# Patient Record
Sex: MALE | Race: BLACK OR AFRICAN AMERICAN | NOT HISPANIC OR LATINO | Employment: UNEMPLOYED | URBAN - METROPOLITAN AREA
[De-identification: names, ages, dates, MRNs, and addresses within clinical notes are randomized per-mention and may not be internally consistent; named-entity substitution may affect disease eponyms.]

---

## 2017-05-20 ENCOUNTER — HOSPITAL ENCOUNTER (EMERGENCY)
Facility: HOSPITAL | Age: 47
Discharge: HOME | End: 2017-05-20
Attending: EMERGENCY MEDICINE
Payer: MEDICAID

## 2017-05-20 VITALS
DIASTOLIC BLOOD PRESSURE: 58 MMHG | HEART RATE: 55 BPM | BODY MASS INDEX: 24.44 KG/M2 | SYSTOLIC BLOOD PRESSURE: 102 MMHG | WEIGHT: 165 LBS | RESPIRATION RATE: 20 BRPM | OXYGEN SATURATION: 97 % | TEMPERATURE: 99 F | HEIGHT: 69 IN

## 2017-05-20 DIAGNOSIS — L03.116 CELLULITIS OF LEFT LOWER EXTREMITY: Primary | ICD-10-CM

## 2017-05-20 DIAGNOSIS — M79.676 TOE PAIN: ICD-10-CM

## 2017-05-20 DIAGNOSIS — L97.521 TOE ULCER, LEFT, LIMITED TO BREAKDOWN OF SKIN: ICD-10-CM

## 2017-05-20 LAB
ALBUMIN SERPL BCP-MCNC: 3.6 G/DL
ALP SERPL-CCNC: 68 U/L
ALT SERPL W/O P-5'-P-CCNC: 21 U/L
ANION GAP SERPL CALC-SCNC: 7 MMOL/L
AST SERPL-CCNC: 21 U/L
BASOPHILS # BLD AUTO: 0.01 K/UL
BASOPHILS NFR BLD: 0.2 %
BILIRUB SERPL-MCNC: 0.4 MG/DL
BUN SERPL-MCNC: 14 MG/DL
CALCIUM SERPL-MCNC: 9.5 MG/DL
CHLORIDE SERPL-SCNC: 105 MMOL/L
CO2 SERPL-SCNC: 26 MMOL/L
CREAT SERPL-MCNC: 1.1 MG/DL
DIFFERENTIAL METHOD: ABNORMAL
EOSINOPHIL # BLD AUTO: 0.1 K/UL
EOSINOPHIL NFR BLD: 1.6 %
ERYTHROCYTE [DISTWIDTH] IN BLOOD BY AUTOMATED COUNT: 14.7 %
EST. GFR  (AFRICAN AMERICAN): >60 ML/MIN/1.73 M^2
EST. GFR  (NON AFRICAN AMERICAN): >60 ML/MIN/1.73 M^2
GLUCOSE SERPL-MCNC: 85 MG/DL
HCT VFR BLD AUTO: 38 %
HGB BLD-MCNC: 12.5 G/DL
LYMPHOCYTES # BLD AUTO: 1.5 K/UL
LYMPHOCYTES NFR BLD: 27.1 %
MCH RBC QN AUTO: 27.4 PG
MCHC RBC AUTO-ENTMCNC: 32.9 %
MCV RBC AUTO: 83 FL
MONOCYTES # BLD AUTO: 0.7 K/UL
MONOCYTES NFR BLD: 13.2 %
NEUTROPHILS # BLD AUTO: 3.2 K/UL
NEUTROPHILS NFR BLD: 57.7 %
PLATELET # BLD AUTO: 231 K/UL
PMV BLD AUTO: 10.7 FL
POTASSIUM SERPL-SCNC: 4.5 MMOL/L
PROT SERPL-MCNC: 7.7 G/DL
RBC # BLD AUTO: 4.57 M/UL
SODIUM SERPL-SCNC: 138 MMOL/L
WBC # BLD AUTO: 5.6 K/UL

## 2017-05-20 PROCEDURE — 85025 COMPLETE CBC W/AUTO DIFF WBC: CPT

## 2017-05-20 PROCEDURE — 25000003 PHARM REV CODE 250: Performed by: PHYSICIAN ASSISTANT

## 2017-05-20 PROCEDURE — 80053 COMPREHEN METABOLIC PANEL: CPT

## 2017-05-20 PROCEDURE — 96375 TX/PRO/DX INJ NEW DRUG ADDON: CPT

## 2017-05-20 PROCEDURE — 99285 EMERGENCY DEPT VISIT HI MDM: CPT | Mod: 25

## 2017-05-20 PROCEDURE — 63600175 PHARM REV CODE 636 W HCPCS: Performed by: PHYSICIAN ASSISTANT

## 2017-05-20 PROCEDURE — 96365 THER/PROPH/DIAG IV INF INIT: CPT

## 2017-05-20 RX ORDER — SULFAMETHOXAZOLE AND TRIMETHOPRIM 800; 160 MG/1; MG/1
2 TABLET ORAL 2 TIMES DAILY
Qty: 40 TABLET | Refills: 0 | Status: SHIPPED | OUTPATIENT
Start: 2017-05-20 | End: 2017-05-30

## 2017-05-20 RX ORDER — CLINDAMYCIN PHOSPHATE 900 MG/50ML
900 INJECTION, SOLUTION INTRAVENOUS
Status: COMPLETED | OUTPATIENT
Start: 2017-05-20 | End: 2017-05-20

## 2017-05-20 RX ORDER — KETOROLAC TROMETHAMINE 30 MG/ML
10 INJECTION, SOLUTION INTRAMUSCULAR; INTRAVENOUS
Status: COMPLETED | OUTPATIENT
Start: 2017-05-20 | End: 2017-05-20

## 2017-05-20 RX ORDER — IBUPROFEN 600 MG/1
600 TABLET ORAL EVERY 6 HOURS PRN
Qty: 30 TABLET | Refills: 0 | Status: SHIPPED | OUTPATIENT
Start: 2017-05-20 | End: 2017-07-06

## 2017-05-20 RX ADMIN — CLINDAMYCIN IN 5 PERCENT DEXTROSE 900 MG: 18 INJECTION, SOLUTION INTRAVENOUS at 01:05

## 2017-05-20 RX ADMIN — KETOROLAC TROMETHAMINE 10 MG: 30 INJECTION, SOLUTION INTRAMUSCULAR at 01:05

## 2017-05-20 RX ADMIN — SODIUM CHLORIDE 1000 ML: 0.9 INJECTION, SOLUTION INTRAVENOUS at 01:05

## 2017-05-20 NOTE — ED AVS SNAPSHOT
[facility]  [address] [place] [address]               Max Mcgregor   2017 12:50 PM   ED    Description:  Male : 1970   Department:  [facility]           Your Care was Coordinated By:     Provider Role From To    Jamin Ponce MD Attending Provider 17 1018 --    Jett Michele PA-C Physician Assistant 17 [phone] --      Reason for Visit     Foot Problem           Diagnoses this Visit        Comments    Cellulitis of left lower extremity    -  Primary     Toe pain         Toe ulcer, left, limited to breakdown of skin           ED Disposition     ED Disposition Condition Comment    Discharge  Elevate leg when at rest.  Continue to take antibiotics as prescribed.  Ibuprofen for analgesia.  Patient to primary care office for reevaluation of wound on Monday.           To Do List           Follow-up Information     Follow up with Otilio Thorpe MD In 2 days.    Specialty:  Internal Medicine    Why:  For wound re-check    Contact information:    [address] [facility] [address]  [phone]          Follow up with [facility].    Specialty:  Emergency Medicine    Why:  As needed, If symptoms worsen    Contact information:    [address] [place] [id number] [phone]       These Medications        Disp Refills Start End    sulfamethoxazole-trimethoprim 800-160mg (BACTRIM DS) 800-160 mg Tab 40 tablet 0 2017    Take 2 tablets by mouth 2 (two) times daily. - Oral    ibuprofen (ADVIL,MOTRIN) 600 MG tablet 30 tablet 0 2017     Take 1 tablet (600 mg total) by mouth every 6 (six) hours as needed for Pain. - Oral      [facility]     [facility] On Call Nurse Care Line - 24/7 Assistance  Unless otherwise directed by your provider, please contact [facility], our nurse care line that is available for 24/7 assistance.     Registered nurses in the [facility] On Call Center  provide: appointment scheduling, clinical advisement, health education, and other advisory services.  Call: [phone] (toll free)               Medications           Message regarding Medications     Verify the changes and/or additions to your medication regime listed below are the same as discussed with your clinician today.  If any of these changes or additions are incorrect, please notify your healthcare provider.        START taking these NEW medications        Refills    sulfamethoxazole-trimethoprim 800-160mg (BACTRIM DS) 800-160 mg Tab 0    Sig: Take 2 tablets by mouth 2 (two) times daily.    Class: Print    Route: Oral    ibuprofen (ADVIL,MOTRIN) 600 MG tablet 0    Sig: Take 1 tablet (600 mg total) by mouth every 6 (six) hours as needed for Pain.    Class: Print    Route: Oral      These medications were administered today        Dose Freq    sodium chloride 0.9% bolus 1,000 mL 1,000 mL ED 1 Time    Sig: Inject 1,000 mLs into the vein ED 1 Time.    Class: Normal    Route: Intravenous    Cosign for Ordering: Accepted by Jamin Ponce MD on 5/20/2017  1:44 PM    ketorolac injection 10 mg 10 mg ED 1 Time    Sig: Inject 10 mg into the vein ED 1 Time.    Class: Normal    Route: Intravenous    Cosign for Ordering: Accepted by Jamin Ponce MD on 5/20/2017  1:44 PM    clindamycin 900 MG/50 ML D5W 900 mg/50 mL IVPB 900 mg 900 mg ED 1 Time    Sig: Inject 50 mLs (900 mg total) into the vein ED 1 Time.    Class: Normal    Route: Intravenous    Cosign for Ordering: Accepted by Jamin Ponce MD on 5/20/2017  1:44 PM           Verify that the below list of medications is an accurate representation of the medications you are currently taking.  If none reported, the list may be blank. If incorrect, please contact your healthcare provider. Carry this list with you in case of emergency.           Current Medications     hydrocortisone 2.5 % cream Apply topically 4 (four) times daily. Use as needed until the rash  "is gone    ibuprofen (ADVIL,MOTRIN) 600 MG tablet Take 1 tablet (600 mg total) by mouth every 6 (six) hours as needed for Pain.    ketoconazole (NIZORAL) 2 % cream Apply topically 2 (two) times daily.    sulfamethoxazole-trimethoprim 800-160mg (BACTRIM DS) 800-160 mg Tab Take 2 tablets by mouth 2 (two) times daily.           Clinical Reference Information           Your Vitals Were     BP Pulse Temp Resp Height Weight    116/62 (BP Location: Left arm, Patient Position: Sitting) 82 98.6 °F (37 °C) (Oral) 20 5' 9" (1.753 m) 74.8 kg (165 lb)    SpO2 BMI             97% 24.37 kg/m2         Allergies as of 5/20/2017     No Known Allergies      Immunizations Administered on Date of Encounter - 5/20/2017     None      ED Micro, Lab, POCT     Start Ordered       Status Ordering Provider    05/20/17 1310 05/20/17 1313  CBC auto differential  STAT      Final result     05/20/17 1310 05/20/17 1313  Comprehensive metabolic panel  STAT      Final result       ED Imaging Orders     Start Ordered       Status Ordering Provider    05/20/17 1312 05/20/17 1313  X-Ray Foot Complete Left  1 time imaging      Final result         Discharge Instructions         Discharge Instructions for Cellulitis  You have been diagnosed with cellulitis. This is an infection in the deepest layer of the skin. In some cases, the infection also affects the muscle. Cellulitis is caused by bacteria. The bacteria can enter the body through broken skin. This can happen with a cut, scratch, animal bite, or an insect bite that has been scratched. You may have been treated in the hospital with antibiotics and fluids. You will likely be given a prescription for antibiotics to take at home. This sheet will help you take care of yourself at home.  Home care  When you are home:  · Take the prescribed antibiotic medicine you are given as directed until it is gone. Take it even if you feel better. It treats the infection and stops it from returning. Not taking all the " medicine can make future infections hard to treat.  · Keep the infected area clean.  · When possible, raise the infected area above the level of your heart. This helps keep swelling down.  · Talk with your healthcare provider if you are in pain. Ask what kind of over-the-counter medicine you can take for pain.  · Apply clean bandages as advised.  · Take your temperature once a day for a week.  · Wash your hands often to prevent spreading the infection.  In the future, wash your hands before and after you touch cuts, scratches, or bandages. This will help prevent infection.   When to call your healthcare provider  Call your healthcare provider immediately if you have any of the following:  · Difficulty or pain when moving the joints above or below the infected area  · Discharge or pus draining from the area  · Fever of 100.4°F (38°C) or higher, or as directed by your healthcare provider  · Pain that gets worse in or around the infected   · Redness that gets worse in or around the infected area, particularly if the area of redness expands to a wider area  · Shaking chills  · Swelling of the infected area  · Vomiting   Date Last Reviewed: 8/1/2016  © 6974-6458 [employer]. [address] [place] [address]. All rights reserved. This information is not intended as a substitute for professional medical care. Always follow your healthcare professional's instructions.          Discharge References/Attachments     R.I.C.E. (ENGLISH)    WOUND CARE (ENGLISH)      [facility] Sign-Up     Activating your [facility] account is as easy as 1-2-3!     1) Visit [email or web address], select Sign Up Now, enter this activation code and your date of birth, then select Next.  Activation code not generated  Current Patient Portal Status: Account disabled      2) Create a username and password to use when you visit [facility] in the future and select a security question in case you lose your password and select Next.    3)  Enter your e-mail address and click Sign Up!    Additional Information  If you have questions, please e-mail [email or web address] or call [phone] to talk to our [facility] staff. Remember, [facility] is NOT to be used for urgent needs. For medical emergencies, dial 911.         Smoking Cessation     If you would like to quit smoking:   You may be eligible for free services if you are a [place] resident and started smoking cigarettes before September 1, 1988.  Call the Smoking Cessation Trust (SCT) toll free at (950) 862-0759 or (610) 803-5989.   Call 9-011-QUIT-NOW if you do not meet the above criteria.   Contact us via email: [email or web address]   View our website for more information: [email or web address]         [facility] complies with applicable Federal civil rights laws and does not discriminate on the basis of race, color, national origin, age, disability, or sex.        Language Assistance Services     ATTENTION: Language assistance services are available, free of charge. Please call [phone].      ATENCIÓN: Si habla español, tiene a mcqueen disposición servicios gratuitos de asistencia lingüística. Llame al [phone].     CHÚ Ý: N?u b?n nói Ti?ng Vi?t, có các d?ch v? h? tr? ngôn ng? mi?n phí dành cho b?n. G?i s? [phone].

## 2017-05-20 NOTE — DISCHARGE INSTRUCTIONS
Discharge Instructions for Cellulitis  You have been diagnosed with cellulitis. This is an infection in the deepest layer of the skin. In some cases, the infection also affects the muscle. Cellulitis is caused by bacteria. The bacteria can enter the body through broken skin. This can happen with a cut, scratch, animal bite, or an insect bite that has been scratched. You may have been treated in the hospital with antibiotics and fluids. You will likely be given a prescription for antibiotics to take at home. This sheet will help you take care of yourself at home.  Home care  When you are home:  · Take the prescribed antibiotic medicine you are given as directed until it is gone. Take it even if you feel better. It treats the infection and stops it from returning. Not taking all the medicine can make future infections hard to treat.  · Keep the infected area clean.  · When possible, raise the infected area above the level of your heart. This helps keep swelling down.  · Talk with your healthcare provider if you are in pain. Ask what kind of over-the-counter medicine you can take for pain.  · Apply clean bandages as advised.  · Take your temperature once a day for a week.  · Wash your hands often to prevent spreading the infection.  In the future, wash your hands before and after you touch cuts, scratches, or bandages. This will help prevent infection.   When to call your healthcare provider  Call your healthcare provider immediately if you have any of the following:  · Difficulty or pain when moving the joints above or below the infected area  · Discharge or pus draining from the area  · Fever of 100.4°F (38°C) or higher, or as directed by your healthcare provider  · Pain that gets worse in or around the infected   · Redness that gets worse in or around the infected area, particularly if the area of redness expands to a wider area  · Shaking chills  · Swelling of the infected area  · Vomiting   Date Last Reviewed:  8/1/2016  © 1217-8508 [employer]. [address] [place] [address], [place] [address]. All rights reserved. This information is not intended as a substitute for professional medical care. Always follow your healthcare professional's instructions.

## 2017-05-20 NOTE — ED PROVIDER NOTES
Encounter Date: 5/20/2017    SCRIBE #1 NOTE: I, Laura Mackey, am scribing for, and in the presence of,  Jett Michele PA-C. I have scribed the following portions of the note - Other sections scribed: HPI/ROS.       History     Chief Complaint   Patient presents with    Foot Problem     arrived via [facility],called to doctor's office for c/o L foot swelling/redness, pt reports lac between toe yesterday and awakening up today with worsening symptoms     Review of patient's allergies indicates:  No Known Allergies  HPI Comments: CC: Foot Problem    HPI: This 47 y.o. male with no pertinent PMHx presents to the ED via EMS c/o left foot pain and swelling that he woke up with this morning.  The patient reports he peeled dead skin from between his 4th and 5th toes accidentally tearing his skin.  He treated the area with Vaseline before going to bed, but woke this morning with the pain radiating to his entire food.  He is not currently on any medications and has no known allergies.  The patient denies fever, emesis or any other associated symptoms.        The history is provided by the patient.     History reviewed. No pertinent past medical history.  No past surgical history on file.  History reviewed. No pertinent family history.  Social History   Substance Use Topics    Smoking status: Current Every Day Smoker    Smokeless tobacco: None    Alcohol use Yes      Comment: occ     Review of Systems   Constitutional: Negative for fever.   Gastrointestinal: Negative for nausea and vomiting.   Musculoskeletal: Negative for back pain.   Skin: Positive for wound.       Physical Exam   Initial Vitals   BP Pulse Resp Temp SpO2   05/20/17 1226 05/20/17 1226 05/20/17 1226 05/20/17 1226 05/20/17 1226   116/62 82 20 98.6 °F (37 °C) 97 %     Physical Exam    Nursing note and vitals reviewed.  Constitutional: Vital signs are normal. He appears well-developed and well-nourished. He is cooperative.  Non-toxic appearance. He does  not have a sickly appearance. He does not appear ill. No distress.   HENT:   Head: Normocephalic and atraumatic.   Neck: Normal range of motion. Neck supple. No tracheal deviation present.   Pulmonary/Chest: Breath sounds normal. No stridor. No respiratory distress. He has no wheezes.   Abdominal: Soft. Bowel sounds are normal. He exhibits no distension. There is no tenderness.   Musculoskeletal: Normal range of motion. He exhibits tenderness.        Left ankle: He exhibits swelling. He exhibits no deformity and normal pulse. No lateral malleolus and no medial malleolus tenderness found. Achilles tendon exhibits no pain.        Feet:    Sensation or motor control intact.   Lymphadenopathy:     He has no cervical adenopathy.   Neurological: He is alert and oriented to person, place, and time. He has normal strength.   Skin: Skin is warm and dry. No rash and no abscess noted. No erythema.   Psychiatric: He has a normal mood and affect. His behavior is normal. Judgment and thought content normal.         ED Course   Procedures  Labs Reviewed - No data to display          Medical Decision Making:   Initial Assessment:   47-year-old male chief complaint left foot swelling and pain, after removing skin from between fourth and fifth toes of left foot.  Differential Diagnosis:   Cellulitis, abscess, infected wound, folliculitis, ulcer  Clinical Tests:   Lab Tests: Ordered and Reviewed  Radiological Study: Ordered and Reviewed  ED Management:  Patient overall well-appearing, in no acute distress, afebrile, vitals within normal limits.  Patient's chief complaint is left fifth toe pain ×2 days.  On physical exam there is significant swelling to his foot and ankle, with exquisite tenderness to palpation of fifth toe.  There is small ulcer between the toes.  I do suspect the initial ulceration is cause significant infection to his foot.  Clinically, I do suspect cellulitis.  We have blood work to ensure that there is no white  count or significant infection.  Vitals are stable, without temperature elevation.  We'll give clindamycin IV piggyback.    CBC without evidence of infection or anemia.  CMP without acute electrolyte abnormality.  X-ray without evidence of bony abnormality or osteomyelitis.  He is afebrile, vitals are stable.  I do think he is safe for outpatient management of cellulitis.  I have instructed him to follow with podiatry or his primary care physician for reevaluation of wound in 3-5 days.  Patient does understand and agree with treatment plan.  Other:   I have discussed this case with another health care provider.       <> Summary of the Discussion: I have discussed this case with Dr. Ponce.  He personally examined the patient            Scribe Attestation:   Scribe #1: I performed the above scribed service and the documentation accurately describes the services I performed. I attest to the accuracy of the note.    Attending Attestation:     Physician Attestation Statement for NP/PA:   I have conducted a face to face encounter with this patient in addition to the NP/PA, due to NP/PA Request    Other NP/PA Attestation Additions:      Medical Decision Makin yo M w/ redness and swelling to L foot.  I agree with plan.       Physician Attestation for Scribe:  Physician Attestation Statement for Scribe #1: I, Jett Michele PA-C, reviewed documentation, as scribed by Laura Mackey in my presence, and it is both accurate and complete.                 ED Course     Clinical Impression:   The primary encounter diagnosis was Cellulitis of left lower extremity. Diagnoses of Toe pain and Toe ulcer, left, limited to breakdown of skin were also pertinent to this visit.    Disposition:   Disposition: Discharged  Condition: Stable  Improved       Jett Michele PA-C  17 1443       Jamin Ponce MD  17 1440

## 2017-07-06 ENCOUNTER — HOSPITAL ENCOUNTER (EMERGENCY)
Facility: HOSPITAL | Age: 47
Discharge: HOME | End: 2017-07-06
Attending: EMERGENCY MEDICINE
Payer: MEDICAID

## 2017-07-06 VITALS
DIASTOLIC BLOOD PRESSURE: 70 MMHG | HEART RATE: 66 BPM | RESPIRATION RATE: 15 BRPM | TEMPERATURE: 99 F | HEIGHT: 69 IN | OXYGEN SATURATION: 97 % | SYSTOLIC BLOOD PRESSURE: 112 MMHG | BODY MASS INDEX: 24.44 KG/M2 | WEIGHT: 165 LBS

## 2017-07-06 DIAGNOSIS — T50.904A DRUG OVERDOSE, UNDETERMINED INTENT, INITIAL ENCOUNTER: Primary | ICD-10-CM

## 2017-07-06 DIAGNOSIS — F19.10 POLYSUBSTANCE ABUSE: ICD-10-CM

## 2017-07-06 LAB
ALBUMIN SERPL BCP-MCNC: 3.5 G/DL
ALP SERPL-CCNC: 76 U/L
ALT SERPL W/O P-5'-P-CCNC: 21 U/L
AMPHET+METHAMPHET UR QL: NEGATIVE
ANION GAP SERPL CALC-SCNC: 7 MMOL/L
APAP SERPL-MCNC: <3 UG/ML
AST SERPL-CCNC: 23 U/L
BARBITURATES UR QL SCN>200 NG/ML: NEGATIVE
BASOPHILS # BLD AUTO: 0.01 K/UL
BASOPHILS NFR BLD: 0.1 %
BENZODIAZ UR QL SCN>200 NG/ML: NORMAL
BILIRUB SERPL-MCNC: 0.3 MG/DL
BILIRUB UR QL STRIP: NEGATIVE
BUN SERPL-MCNC: 13 MG/DL
BZE UR QL SCN: NORMAL
CALCIUM SERPL-MCNC: 8.8 MG/DL
CANNABINOIDS UR QL SCN: NEGATIVE
CHLORIDE SERPL-SCNC: 105 MMOL/L
CK SERPL-CCNC: 92 U/L
CLARITY UR: CLEAR
CO2 SERPL-SCNC: 25 MMOL/L
COLOR UR: YELLOW
CREAT SERPL-MCNC: 1 MG/DL
CREAT UR-MCNC: 230.4 MG/DL
DIFFERENTIAL METHOD: ABNORMAL
EOSINOPHIL # BLD AUTO: 0 K/UL
EOSINOPHIL NFR BLD: 0.4 %
ERYTHROCYTE [DISTWIDTH] IN BLOOD BY AUTOMATED COUNT: 14.4 %
EST. GFR  (AFRICAN AMERICAN): >60 ML/MIN/1.73 M^2
EST. GFR  (NON AFRICAN AMERICAN): >60 ML/MIN/1.73 M^2
ETHANOL SERPL-MCNC: 35 MG/DL
GLUCOSE SERPL-MCNC: 94 MG/DL
GLUCOSE UR QL STRIP: NEGATIVE
HCT VFR BLD AUTO: 40.1 %
HGB BLD-MCNC: 13 G/DL
HGB UR QL STRIP: NEGATIVE
KETONES UR QL STRIP: NEGATIVE
LEUKOCYTE ESTERASE UR QL STRIP: NEGATIVE
LYMPHOCYTES # BLD AUTO: 1.3 K/UL
LYMPHOCYTES NFR BLD: 18.4 %
MAGNESIUM SERPL-MCNC: 2.2 MG/DL
MCH RBC QN AUTO: 26.7 PG
MCHC RBC AUTO-ENTMCNC: 32.4 %
MCV RBC AUTO: 82 FL
METHADONE UR QL SCN>300 NG/ML: NEGATIVE
MONOCYTES # BLD AUTO: 0.5 K/UL
MONOCYTES NFR BLD: 7.5 %
NEUTROPHILS # BLD AUTO: 5 K/UL
NEUTROPHILS NFR BLD: 73.6 %
NITRITE UR QL STRIP: NEGATIVE
OPIATES UR QL SCN: NORMAL
PCP UR QL SCN>25 NG/ML: NEGATIVE
PH UR STRIP: 5 [PH] (ref 5–8)
PLATELET # BLD AUTO: 221 K/UL
PMV BLD AUTO: 10.9 FL
POTASSIUM SERPL-SCNC: 4 MMOL/L
PROT SERPL-MCNC: 8.3 G/DL
PROT UR QL STRIP: NEGATIVE
RBC # BLD AUTO: 4.87 M/UL
SODIUM SERPL-SCNC: 137 MMOL/L
SP GR UR STRIP: 1.03 (ref 1–1.03)
TOXICOLOGY INFORMATION: NORMAL
URN SPEC COLLECT METH UR: NORMAL
UROBILINOGEN UR STRIP-ACNC: NEGATIVE EU/DL
WBC # BLD AUTO: 6.83 K/UL

## 2017-07-06 PROCEDURE — 99285 EMERGENCY DEPT VISIT HI MDM: CPT | Mod: 25

## 2017-07-06 PROCEDURE — 83735 ASSAY OF MAGNESIUM: CPT

## 2017-07-06 PROCEDURE — 81003 URINALYSIS AUTO W/O SCOPE: CPT

## 2017-07-06 PROCEDURE — 80307 DRUG TEST PRSMV CHEM ANLYZR: CPT

## 2017-07-06 PROCEDURE — 82550 ASSAY OF CK (CPK): CPT

## 2017-07-06 PROCEDURE — 80329 ANALGESICS NON-OPIOID 1 OR 2: CPT

## 2017-07-06 PROCEDURE — 85025 COMPLETE CBC W/AUTO DIFF WBC: CPT

## 2017-07-06 PROCEDURE — 93005 ELECTROCARDIOGRAM TRACING: CPT

## 2017-07-06 PROCEDURE — 80053 COMPREHEN METABOLIC PANEL: CPT

## 2017-07-06 PROCEDURE — 80320 DRUG SCREEN QUANTALCOHOLS: CPT

## 2017-07-06 PROCEDURE — 63600175 PHARM REV CODE 636 W HCPCS: Performed by: EMERGENCY MEDICINE

## 2017-07-06 PROCEDURE — 96374 THER/PROPH/DIAG INJ IV PUSH: CPT

## 2017-07-06 RX ORDER — NALOXONE HCL 0.4 MG/ML
0.4 VIAL (ML) INJECTION
Status: COMPLETED | OUTPATIENT
Start: 2017-07-06 | End: 2017-07-06

## 2017-07-06 RX ADMIN — NALOXONE HYDROCHLORIDE 0.4 MG: 0.4 INJECTION, SOLUTION INTRAMUSCULAR; INTRAVENOUS; SUBCUTANEOUS at 04:07

## 2017-07-06 NOTE — ED TRIAGE NOTES
Pt here via EMS for suspected drug overdose. Pt was found unresponsive by bystanders, who then called EMS. When EMS arrived, pt was unresponsive in vehicle; responded to sternal rub; no narcan given. Pt awake and alert now responding to questions; when asked which hospital he is asked pt stated [facility]. Knows the year and day. Denies any pain or nausea. Pt reports he used heroin and consumed alcohol.

## 2017-07-06 NOTE — ED PROVIDER NOTES
Encounter Date: 7/6/2017    SCRIBE #1 NOTE: I, Stanradha Nilson, am scribing for, and in the presence of,  Jamin Ponce MD. I have scribed the following portions of the note - Other sections scribed: HPI/ROS.       History     Chief Complaint   Patient presents with    UNRESPONSIVE     arrived via EMS, called to vehicle, EMS reports bystander saw unresponsive male in vehicle, EMS reports pt was sitting in his car in the back of his home, INITIAL GCS OF 3, STERNAL RUB AND EMS REPORTS PT AWAKEN, GCS15, NO MEDS GIVEN, ADMITS TO ETOH/HEROIN      CC: Unresponsive    HPI: This 47 y.o. Male with no pertinent PMHx presents to the ED after being found unresponsive by a bystander today. Patient recalls waking up in police presence. He states he did consume alcohol, but cannot recall how much consumption. He also reports heroin use today as well. Patient denies any chest pain, back pain, abdominal pain, fever, chills, diaphoresis, and other associated symptoms. No hx of drug overdoses.       The history is provided by the patient. No  was used.     Review of patient's allergies indicates:  No Known Allergies  History reviewed. No pertinent past medical history.  No past surgical history on file.  History reviewed. No pertinent family history.  Social History   Substance Use Topics    Smoking status: Current Every Day Smoker    Smokeless tobacco: Not on file    Alcohol use Yes      Comment: occ     Review of Systems   Constitutional: Negative for chills and fever.   HENT: Negative for congestion.    Eyes: Negative for pain.   Respiratory: Negative for cough and shortness of breath.    Cardiovascular: Negative for chest pain.   Gastrointestinal: Negative for abdominal pain, diarrhea, nausea and vomiting.   Genitourinary: Negative for difficulty urinating.   Musculoskeletal: Negative for back pain, neck pain and neck stiffness.   Skin: Negative for wound.   Neurological: Negative for dizziness,  weakness, light-headedness and numbness.       Physical Exam     Initial Vitals [07/06/17 1604]   BP Pulse Resp Temp SpO2   (!) 156/89 (!) 116 20 99.9 °F (37.7 °C) 97 %      MAP       111.33         Physical Exam    Nursing note and vitals reviewed.  HENT:   Head: Atraumatic.   Eyes: Conjunctivae and EOM are normal.   Neck: Normal range of motion.   Cardiovascular: Exam reveals no gallop and no friction rub.    No murmur heard.  Tachycardic   Pulmonary/Chest: Breath sounds normal. No respiratory distress. He has no wheezes. He has no rales.   Abdominal: Soft. Bowel sounds are normal. He exhibits no distension. There is no tenderness.   Musculoskeletal: Normal range of motion. He exhibits no edema.   Neurological:   Somnolent but arousable to verbal stimuli   Skin: Skin is warm and dry.   Psychiatric: He has a normal mood and affect.         ED Course   Procedures  Labs Reviewed   CBC W/ AUTO DIFFERENTIAL - Abnormal; Notable for the following:        Result Value    Hemoglobin 13.0 (*)     MCH 26.7 (*)     Gran% 73.6 (*)     All other components within normal limits   COMPREHENSIVE METABOLIC PANEL - Abnormal; Notable for the following:     Anion Gap 7 (*)     All other components within normal limits   ALCOHOL,MEDICAL (ETHANOL) - Abnormal; Notable for the following:     Alcohol, Medical, Serum 35 (*)     All other components within normal limits   ACETAMINOPHEN LEVEL - Abnormal; Notable for the following:     Acetaminophen (Tylenol), Serum <3.0 (*)     All other components within normal limits   MAGNESIUM   DRUG SCREEN PANEL, URINE EMERGENCY   CK   URINALYSIS             Medical Decision Making:   Initial Assessment:   47-year-old male brought in by EMS after he was found unresponsive in his vehicle at home.  Patient admits to using heroin and drinking alcohol earlier today.  He was aroused with sternal rub.  On exam he is awake and alert however by the end of my exam he does appear to be somewhat drowsy.  He has  pinpoint pupils bilaterally.  No evidence of trauma.  Remainder physical exam is normal.  Labs notable for drug screen positive for opiates, cocaine and benzodiazepines.  Alcohol level mildly elevated.  Patient did require 1 dose of Narcan.   EKG reviewed and interpreted myself shows no evidence of acute ischemia. He has declined head CT.  Patient has been observed in the ED for several hours.  He is easily arousable now.  He is not falling asleep during conversation.  A friend or family will come pick him up and bring him home and he has been instructed to follow-up with primary care soon as possible.  Patient counseled on drug use.  Return instructions given.  Patient verbalized understanding and agreement with the plan.            Scribe Attestation:   Scribe #1: I performed the above scribed service and the documentation accurately describes the services I performed. I attest to the accuracy of the note.    Attending Attestation:           Physician Attestation for Scribe:  Physician Attestation Statement for Scribe #1: I, Jamin Ponce MD, reviewed documentation, as scribed by Maik Devine in my presence, and it is both accurate and complete.                 ED Course     Clinical Impression:   The primary encounter diagnosis was Drug overdose, undetermined intent, initial encounter. A diagnosis of Polysubstance abuse was also pertinent to this visit.                           Jamin Ponce MD  07/06/17 1957

## 2017-07-06 NOTE — ED NOTES
Dr. Ponce aware of pt's initial symptoms, EMS intervention, sternal rub, initial GCS 3, reports of ETOH/heroin abuse, currently awake/alert, no meds given by EMS, verbalized understanding

## 2017-07-07 NOTE — ED NOTES
Received report from Malena CARBAJAL. 1st contact with pt. Pt AAO x 3, REU, skin warm, dry and intact. Side rails up x 2, bed in low position, wheels locked. Call bell within reach. Pt is AAOX4 w/ occasional dozing off. Pt denies any pain. Will continue to monitor

## 2020-03-09 ENCOUNTER — HOSPITAL ENCOUNTER (OUTPATIENT)
Facility: HOSPITAL | Age: 50
Discharge: HOME | End: 2020-03-12
Attending: EMERGENCY MEDICINE | Admitting: ORTHOPAEDIC SURGERY
Payer: MEDICAID

## 2020-03-09 DIAGNOSIS — M79.89 SWELLING OF LEFT HAND: ICD-10-CM

## 2020-03-09 DIAGNOSIS — A41.9 SEPSIS: ICD-10-CM

## 2020-03-09 DIAGNOSIS — L02.519 CELLULITIS AND ABSCESS OF HAND, EXCEPT FINGERS AND THUMB: Primary | ICD-10-CM

## 2020-03-09 DIAGNOSIS — L03.119 CELLULITIS AND ABSCESS OF HAND, EXCEPT FINGERS AND THUMB: Primary | ICD-10-CM

## 2020-03-09 LAB
ALBUMIN SERPL BCP-MCNC: 3.3 G/DL (ref 3.5–5.2)
ALP SERPL-CCNC: 78 U/L (ref 55–135)
ALT SERPL W/O P-5'-P-CCNC: 12 U/L (ref 10–44)
AMPHET+METHAMPHET UR QL: NEGATIVE
ANION GAP SERPL CALC-SCNC: 12 MMOL/L (ref 8–16)
AST SERPL-CCNC: 22 U/L (ref 10–40)
BARBITURATES UR QL SCN>200 NG/ML: NEGATIVE
BASOPHILS # BLD AUTO: 0.01 K/UL (ref 0–0.2)
BASOPHILS NFR BLD: 0.1 % (ref 0–1.9)
BENZODIAZ UR QL SCN>200 NG/ML: NEGATIVE
BILIRUB SERPL-MCNC: 0.4 MG/DL (ref 0.1–1)
BILIRUB UR QL STRIP: NEGATIVE
BUN SERPL-MCNC: 12 MG/DL (ref 6–20)
BZE UR QL SCN: NORMAL
CALCIUM SERPL-MCNC: 9.1 MG/DL (ref 8.7–10.5)
CANNABINOIDS UR QL SCN: NEGATIVE
CHLORIDE SERPL-SCNC: 105 MMOL/L (ref 95–110)
CLARITY UR: CLEAR
CO2 SERPL-SCNC: 18 MMOL/L (ref 23–29)
COLOR UR: YELLOW
CREAT SERPL-MCNC: 0.9 MG/DL (ref 0.5–1.4)
CREAT UR-MCNC: 61.8 MG/DL (ref 23–375)
CRP SERPL-MCNC: 65.7 MG/L (ref 0–8.2)
DIFFERENTIAL METHOD: ABNORMAL
EOSINOPHIL # BLD AUTO: 0.1 K/UL (ref 0–0.5)
EOSINOPHIL NFR BLD: 0.7 % (ref 0–8)
ERYTHROCYTE [DISTWIDTH] IN BLOOD BY AUTOMATED COUNT: 14 % (ref 11.5–14.5)
ERYTHROCYTE [SEDIMENTATION RATE] IN BLOOD BY WESTERGREN METHOD: 78 MM/HR (ref 0–10)
EST. GFR  (AFRICAN AMERICAN): >60 ML/MIN/1.73 M^2
EST. GFR  (NON AFRICAN AMERICAN): >60 ML/MIN/1.73 M^2
GLUCOSE SERPL-MCNC: 101 MG/DL (ref 70–110)
GLUCOSE UR QL STRIP: NEGATIVE
HCT VFR BLD AUTO: 39.2 % (ref 40–54)
HGB BLD-MCNC: 12.7 G/DL (ref 14–18)
HGB UR QL STRIP: NEGATIVE
IMM GRANULOCYTES # BLD AUTO: 0.03 K/UL (ref 0–0.04)
IMM GRANULOCYTES NFR BLD AUTO: 0.3 % (ref 0–0.5)
KETONES UR QL STRIP: NEGATIVE
LACTATE SERPL-SCNC: 0.8 MMOL/L (ref 0.5–2.2)
LACTATE SERPL-SCNC: 0.9 MMOL/L (ref 0.5–2.2)
LEUKOCYTE ESTERASE UR QL STRIP: NEGATIVE
LYMPHOCYTES # BLD AUTO: 1.5 K/UL (ref 1–4.8)
LYMPHOCYTES NFR BLD: 16.1 % (ref 18–48)
MCH RBC QN AUTO: 26 PG (ref 27–31)
MCHC RBC AUTO-ENTMCNC: 32.4 G/DL (ref 32–36)
MCV RBC AUTO: 80 FL (ref 82–98)
METHADONE UR QL SCN>300 NG/ML: NEGATIVE
MONOCYTES # BLD AUTO: 0.8 K/UL (ref 0.3–1)
MONOCYTES NFR BLD: 8.6 % (ref 4–15)
NEUTROPHILS # BLD AUTO: 6.8 K/UL (ref 1.8–7.7)
NEUTROPHILS NFR BLD: 74.2 % (ref 38–73)
NITRITE UR QL STRIP: NEGATIVE
NRBC BLD-RTO: 0 /100 WBC
OPIATES UR QL SCN: NORMAL
PCP UR QL SCN>25 NG/ML: NEGATIVE
PH UR STRIP: 6 [PH] (ref 5–8)
PLATELET # BLD AUTO: 303 K/UL (ref 150–350)
PMV BLD AUTO: 13.1 FL (ref 9.2–12.9)
POTASSIUM SERPL-SCNC: 4.5 MMOL/L (ref 3.5–5.1)
PROCALCITONIN SERPL IA-MCNC: 0.43 NG/ML
PROT SERPL-MCNC: 8.4 G/DL (ref 6–8.4)
PROT UR QL STRIP: NEGATIVE
RBC # BLD AUTO: 4.89 M/UL (ref 4.6–6.2)
SODIUM SERPL-SCNC: 135 MMOL/L (ref 136–145)
SP GR UR STRIP: 1.01 (ref 1–1.03)
TOXICOLOGY INFORMATION: NORMAL
URN SPEC COLLECT METH UR: NORMAL
UROBILINOGEN UR STRIP-ACNC: NEGATIVE EU/DL
WBC # BLD AUTO: 9.15 K/UL (ref 3.9–12.7)

## 2020-03-09 PROCEDURE — 96375 TX/PRO/DX INJ NEW DRUG ADDON: CPT

## 2020-03-09 PROCEDURE — 81003 URINALYSIS AUTO W/O SCOPE: CPT | Mod: 59

## 2020-03-09 PROCEDURE — 87077 CULTURE AEROBIC IDENTIFY: CPT

## 2020-03-09 PROCEDURE — 96375 TX/PRO/DX INJ NEW DRUG ADDON: CPT | Mod: 59

## 2020-03-09 PROCEDURE — G0378 HOSPITAL OBSERVATION PER HR: HCPCS

## 2020-03-09 PROCEDURE — 86140 C-REACTIVE PROTEIN: CPT

## 2020-03-09 PROCEDURE — 83605 ASSAY OF LACTIC ACID: CPT | Mod: 91

## 2020-03-09 PROCEDURE — 96367 TX/PROPH/DG ADDL SEQ IV INF: CPT

## 2020-03-09 PROCEDURE — 96366 THER/PROPH/DIAG IV INF ADDON: CPT | Mod: 59

## 2020-03-09 PROCEDURE — 85652 RBC SED RATE AUTOMATED: CPT

## 2020-03-09 PROCEDURE — 25000003 PHARM REV CODE 250: Performed by: PHYSICIAN ASSISTANT

## 2020-03-09 PROCEDURE — S0028 INJECTION, FAMOTIDINE, 20 MG: HCPCS | Performed by: PHYSICIAN ASSISTANT

## 2020-03-09 PROCEDURE — 96376 TX/PRO/DX INJ SAME DRUG ADON: CPT

## 2020-03-09 PROCEDURE — 93005 ELECTROCARDIOGRAM TRACING: CPT

## 2020-03-09 PROCEDURE — 84145 PROCALCITONIN (PCT): CPT

## 2020-03-09 PROCEDURE — 85025 COMPLETE CBC W/AUTO DIFF WBC: CPT

## 2020-03-09 PROCEDURE — 63600175 PHARM REV CODE 636 W HCPCS: Performed by: PHYSICIAN ASSISTANT

## 2020-03-09 PROCEDURE — 87040 BLOOD CULTURE FOR BACTERIA: CPT | Mod: 59

## 2020-03-09 PROCEDURE — 96376 TX/PRO/DX INJ SAME DRUG ADON: CPT | Mod: 59

## 2020-03-09 PROCEDURE — 10061 I&D ABSCESS COMP/MULTIPLE: CPT

## 2020-03-09 PROCEDURE — 96365 THER/PROPH/DIAG IV INF INIT: CPT

## 2020-03-09 PROCEDURE — 93010 ELECTROCARDIOGRAM REPORT: CPT | Mod: ,,, | Performed by: INTERNAL MEDICINE

## 2020-03-09 PROCEDURE — 87186 SC STD MICRODIL/AGAR DIL: CPT

## 2020-03-09 PROCEDURE — 99285 EMERGENCY DEPT VISIT HI MDM: CPT | Mod: 25

## 2020-03-09 PROCEDURE — 93010 EKG 12-LEAD: ICD-10-PCS | Mod: ,,, | Performed by: INTERNAL MEDICINE

## 2020-03-09 PROCEDURE — 80307 DRUG TEST PRSMV CHEM ANLYZR: CPT

## 2020-03-09 PROCEDURE — 90471 IMMUNIZATION ADMIN: CPT | Performed by: PHYSICIAN ASSISTANT

## 2020-03-09 PROCEDURE — 90715 TDAP VACCINE 7 YRS/> IM: CPT | Performed by: PHYSICIAN ASSISTANT

## 2020-03-09 PROCEDURE — 87070 CULTURE OTHR SPECIMN AEROBIC: CPT

## 2020-03-09 PROCEDURE — 80053 COMPREHEN METABOLIC PANEL: CPT

## 2020-03-09 RX ORDER — HYDROMORPHONE HYDROCHLORIDE 2 MG/ML
1 INJECTION, SOLUTION INTRAMUSCULAR; INTRAVENOUS; SUBCUTANEOUS
Status: COMPLETED | OUTPATIENT
Start: 2020-03-09 | End: 2020-03-09

## 2020-03-09 RX ORDER — VANCOMYCIN HCL IN 5 % DEXTROSE 1G/250ML
1000 PLASTIC BAG, INJECTION (ML) INTRAVENOUS
Status: DISCONTINUED | OUTPATIENT
Start: 2020-03-10 | End: 2020-03-11 | Stop reason: DRUGHIGH

## 2020-03-09 RX ORDER — SODIUM CHLORIDE 0.9 % (FLUSH) 0.9 %
10 SYRINGE (ML) INJECTION
Status: DISCONTINUED | OUTPATIENT
Start: 2020-03-09 | End: 2020-03-12 | Stop reason: HOSPADM

## 2020-03-09 RX ORDER — LIDOCAINE HYDROCHLORIDE AND EPINEPHRINE 10; 10 MG/ML; UG/ML
10 INJECTION, SOLUTION INFILTRATION; PERINEURAL
Status: COMPLETED | OUTPATIENT
Start: 2020-03-09 | End: 2020-03-09

## 2020-03-09 RX ORDER — FAMOTIDINE 10 MG/ML
20 INJECTION INTRAVENOUS EVERY 12 HOURS
Status: DISCONTINUED | OUTPATIENT
Start: 2020-03-09 | End: 2020-03-12 | Stop reason: HOSPADM

## 2020-03-09 RX ORDER — ACETAMINOPHEN 325 MG/1
650 TABLET ORAL EVERY 8 HOURS PRN
Status: DISCONTINUED | OUTPATIENT
Start: 2020-03-09 | End: 2020-03-12 | Stop reason: HOSPADM

## 2020-03-09 RX ORDER — MORPHINE SULFATE 10 MG/ML
2 INJECTION INTRAMUSCULAR; INTRAVENOUS; SUBCUTANEOUS EVERY 4 HOURS PRN
Status: DISCONTINUED | OUTPATIENT
Start: 2020-03-09 | End: 2020-03-12 | Stop reason: HOSPADM

## 2020-03-09 RX ORDER — IBUPROFEN 600 MG/1
600 TABLET ORAL EVERY 6 HOURS PRN
Status: DISCONTINUED | OUTPATIENT
Start: 2020-03-09 | End: 2020-03-12 | Stop reason: HOSPADM

## 2020-03-09 RX ORDER — ONDANSETRON 2 MG/ML
4 INJECTION INTRAMUSCULAR; INTRAVENOUS EVERY 8 HOURS PRN
Status: DISCONTINUED | OUTPATIENT
Start: 2020-03-09 | End: 2020-03-12 | Stop reason: HOSPADM

## 2020-03-09 RX ORDER — SODIUM CHLORIDE 9 MG/ML
INJECTION, SOLUTION INTRAVENOUS CONTINUOUS
Status: DISCONTINUED | OUTPATIENT
Start: 2020-03-09 | End: 2020-03-12 | Stop reason: HOSPADM

## 2020-03-09 RX ORDER — HYDROMORPHONE HYDROCHLORIDE 2 MG/ML
1 INJECTION, SOLUTION INTRAMUSCULAR; INTRAVENOUS; SUBCUTANEOUS EVERY 4 HOURS PRN
Status: DISCONTINUED | OUTPATIENT
Start: 2020-03-09 | End: 2020-03-11

## 2020-03-09 RX ADMIN — HYDROMORPHONE HYDROCHLORIDE 1 MG: 2 INJECTION, SOLUTION INTRAMUSCULAR; INTRAVENOUS; SUBCUTANEOUS at 11:03

## 2020-03-09 RX ADMIN — HYDROMORPHONE HYDROCHLORIDE 1 MG: 2 INJECTION, SOLUTION INTRAMUSCULAR; INTRAVENOUS; SUBCUTANEOUS at 12:03

## 2020-03-09 RX ADMIN — SODIUM CHLORIDE: 0.9 INJECTION, SOLUTION INTRAVENOUS at 10:03

## 2020-03-09 RX ADMIN — PIPERACILLIN AND TAZOBACTAM 4.5 G: 4; .5 INJECTION, POWDER, FOR SOLUTION INTRAVENOUS at 12:03

## 2020-03-09 RX ADMIN — CLOSTRIDIUM TETANI TOXOID ANTIGEN (FORMALDEHYDE INACTIVATED), CORYNEBACTERIUM DIPHTHERIAE TOXOID ANTIGEN (FORMALDEHYDE INACTIVATED), BORDETELLA PERTUSSIS TOXOID ANTIGEN (GLUTARALDEHYDE INACTIVATED), BORDETELLA PERTUSSIS FILAMENTOUS HEMAGGLUTININ ANTIGEN (FORMALDEHYDE INACTIVATED), BORDETELLA PERTUSSIS PERTACTIN ANTIGEN, AND BORDETELLA PERTUSSIS FIMBRIAE 2/3 ANTIGEN 0.5 ML: 5; 2; 2.5; 5; 3; 5 INJECTION, SUSPENSION INTRAMUSCULAR at 12:03

## 2020-03-09 RX ADMIN — FAMOTIDINE 20 MG: 10 INJECTION INTRAVENOUS at 10:03

## 2020-03-09 RX ADMIN — PIPERACILLIN AND TAZOBACTAM 4.5 G: 4; .5 INJECTION, POWDER, FOR SOLUTION INTRAVENOUS at 10:03

## 2020-03-09 RX ADMIN — HYDROMORPHONE HYDROCHLORIDE 1 MG: 2 INJECTION, SOLUTION INTRAMUSCULAR; INTRAVENOUS; SUBCUTANEOUS at 01:03

## 2020-03-09 RX ADMIN — VANCOMYCIN HYDROCHLORIDE 1250 MG: 1.25 INJECTION, POWDER, LYOPHILIZED, FOR SOLUTION INTRAVENOUS at 12:03

## 2020-03-09 RX ADMIN — SODIUM CHLORIDE 2313 ML: 0.9 INJECTION, SOLUTION INTRAVENOUS at 12:03

## 2020-03-09 RX ADMIN — LIDOCAINE HYDROCHLORIDE,EPINEPHRINE BITARTRATE 10 ML: 10; .01 INJECTION, SOLUTION INFILTRATION; PERINEURAL at 12:03

## 2020-03-09 NOTE — H&P
49-year-old male reports 3-4 day history of swelling and pain left dorsal wrist.  Patient believes it was a spider bite because he is seen many spiders around his home but does not recall an actual bite.  I&D done by ER PA which produced copious amounts of purulent material.  Two incisions were packed with iodoform gauze and left open and continued to drain.    Patient gives a history of IV drug abuse in the past but denies recent IV drug use.  Patient had a traumatic amputation of his right 5th digit due to a work-related injury many years ago.  This is completely healed.  Patient denies any other abscess, swelling or painful areas.    WBC normal  ESR and CRP markedly elevated    Drug screen presumptive positive for cocaine and opiates    Radiographs left wrist normal    Cultures from I and D in ER pending, blood cultures pending    Physical exam shows he is alert and oriented, afebrile.    Patient has painful range of motion in the left wrist and digits.  There is 2 incisions on the dorsal aspect of the wrist with iodoform gauze, purulent drainage present.  Tenderness to palpation.  10% range of motion wrist with pain.  Palmar and volar there is no erythema or swelling with no obvious infection on the volar aspect of the wrist.    Patient has an obvious infection which has been drained in the ER and sent for culture.  I believe he requires further drainage of the extensor tendons to the digits and possibly the wrist joint if this is involved.  It was recommended to the patient he be brought to the operating room tonight to undergo open I and D, he has refused this and wants to wait until tomorrow.  In the meantime we will obtain an MRI to determine if the left wrist joint is involved with the infection.  NPO past midnight for I&D tomorrow  MRI of the left wrist to rule out wrist joint abscess

## 2020-03-09 NOTE — ED PROVIDER NOTES
Encounter Date: 3/9/2020    SCRIBE #1 NOTE: I, Josy Radford, am scribing for, and in the presence of,  FEDERICA Richards. I have scribed the following portions of the note - Other sections scribed: HPI, ROS, PE.       History     Chief Complaint   Patient presents with    Hand Pain     left hand pain with swelling.  states spider bite.     CC: Hand Pain and Swelling  HPI: This is a 49 y.o. male with no PMHx who presents to the ED complaining of left hand pain and swelling that started 2 days ago. He reports that he woke up with worsening pain this morning. He states that he thinks a spider bit him. He denies any recent injury or trauma. He denies any exposure to salt water. He denies any allergies to medications. He denies any recent IV drug use (last used 5 or 6 years ago). No other associated symptoms.    The history is provided by the patient. No  was used.     Review of patient's allergies indicates:  No Known Allergies  History reviewed. No pertinent past medical history.  History reviewed. No pertinent surgical history.  History reviewed. No pertinent family history.  Social History     Tobacco Use    Smoking status: Current Every Day Smoker     Packs/day: 0.50     Types: Cigarettes   Substance Use Topics    Alcohol use: Yes     Comment: 6 pack of beer per week    Drug use: Never     Review of Systems   Constitutional: Negative for fever.   HENT: Negative for sore throat.    Respiratory: Negative for shortness of breath.    Cardiovascular: Negative for chest pain.   Gastrointestinal: Negative for nausea.   Genitourinary: Negative for dysuria.   Musculoskeletal: Negative for back pain.        (+) Hand pain (left)  (+) Hand swelling (left)   Skin: Negative for rash.        (+) Abscess (left hand)   Neurological: Negative for weakness.   Hematological: Does not bruise/bleed easily.       Physical Exam     Initial Vitals [03/09/20 1014]   BP Pulse Resp Temp SpO2   133/73 100 18 (!) 101.6 °F  (38.7 °C) 97 %      MAP       --         Physical Exam    Nursing note and vitals reviewed.  Constitutional: He appears well-developed and well-nourished. He appears ill. He appears distressed.   Tearful, moaning in pain.   HENT:   Head: Normocephalic and atraumatic.   Right Ear: Tympanic membrane normal.   Left Ear: Tympanic membrane normal.   Nose: Nose normal.   Mouth/Throat: Uvula is midline, oropharynx is clear and moist and mucous membranes are normal.   Eyes: EOM are normal. Pupils are equal, round, and reactive to light.   Neck: Trachea normal, normal range of motion, full passive range of motion without pain and phonation normal. Neck supple. No stridor present. No spinous process tenderness and no muscular tenderness present. Normal range of motion present. No neck rigidity.   Cardiovascular: Regular rhythm and normal heart sounds. Tachycardia present.  Exam reveals no gallop and no friction rub.    No murmur heard.  Pulmonary/Chest: Effort normal and breath sounds normal. No respiratory distress. He has no wheezes. He has no rhonchi. He has no rales.   Musculoskeletal: Normal range of motion.   Diffuse swelling to the dorsum of the left hand.  No pain with extension of fingers.   Neurological: He is alert and oriented to person, place, and time. He has normal strength. No sensory deficit.   Skin: Skin is warm and dry. Capillary refill takes less than 2 seconds. Abscess noted. There is erythema.   Diffuse edema and tenderness to the dorsal left wrist and hand 2 areas of fluctuance to the left dorsal wrist.  No crepitus or bullous lesions.  Superior healing scratches to the dorsum of the left hand.         ED Course   I & D - Incision and Drainage  Date/Time: 3/9/2020 11:43 AM  Performed by: Brian Reyes PA-C  Authorized by: Brian Reyes PA-C   Consent Done: Yes  Consent: Verbal consent obtained.  Consent given by: patient  Type: abscess  Body area: upper extremity  Location details: left  hand  Anesthesia: local infiltration    Anesthesia:  Local Anesthetic: lidocaine 1% with epinephrine  Anesthetic total: 4 mL  Scalpel size: 11  Incision type: single straight  Complexity: complex  Drainage: pus and  bloody  Drainage amount: copious  Wound treatment: incision,  wound left open,  drainage,  deloculation,  expression of material and  wound packed  Packing material: 1/4 in gauze  Complications: No  Specimens: Yes  Implants: No  Patient tolerance: Patient tolerated the procedure well with no immediate complications        Labs Reviewed   CBC W/ AUTO DIFFERENTIAL - Abnormal; Notable for the following components:       Result Value    Hemoglobin 12.7 (*)     Hematocrit 39.2 (*)     Mean Corpuscular Volume 80 (*)     Mean Corpuscular Hemoglobin 26.0 (*)     MPV 13.1 (*)     Gran% 74.2 (*)     Lymph% 16.1 (*)     All other components within normal limits   COMPREHENSIVE METABOLIC PANEL - Abnormal; Notable for the following components:    Sodium 135 (*)     CO2 18 (*)     Albumin 3.3 (*)     All other components within normal limits    Narrative:     Recoll. [id number] by Jovie at 03/09/2020 12:10, reason: Specimen   hemolyzed call to haroldo  03/09/2020  12:09   SEDIMENTATION RATE - Abnormal; Notable for the following components:    Sed Rate 78 (*)     All other components within normal limits   C-REACTIVE PROTEIN - Abnormal; Notable for the following components:    CRP 65.7 (*)     All other components within normal limits    Narrative:     Recoll. [id number] by TN3 at 03/09/2020 12:10, reason: Specimen   hemolyzed call to haroldo  03/09/2020  12:09   PROCALCITONIN - Abnormal; Notable for the following components:    Procalcitonin 0.43 (*)     All other components within normal limits    Narrative:     Recoll. [id number] by Jovie at 03/09/2020 12:10, reason: Specimen   hemolyzed call to haroldo  03/09/2020  12:09   CULTURE, BLOOD   CULTURE, BLOOD   CULTURE, AEROBIC  (SPECIFY SOURCE)   LACTIC ACID, PLASMA   LACTIC  ACID, PLASMA   URINALYSIS, REFLEX TO URINE CULTURE   DRUG SCREEN PANEL, URINE EMERGENCY          Imaging Results          X-Ray Hand 3 View Left (Final result)  Result time 03/09/20 11:44:57   Procedure changed from X-Ray Hand 2 View Left     Final result by Oh London MD (03/09/20 11:44:57)                 Impression:      As above      Electronically signed by: Oh London MD  Date:    03/09/2020  Time:    11:44             Narrative:    EXAMINATION:  XR HAND COMPLETE 3 VIEW LEFT    CLINICAL HISTORY:  swelling of left hand;. Other specified soft tissue disorders    TECHNIQUE:  PA, lateral, and oblique views of the left hand were performed.    COMPARISON:  None    FINDINGS:  There are no acute fractures or dislocations or osteoblastic or lytic lesions.  There is marked soft tissue swelling over the dorsum of the hand.  Cellulitis/infectious, or hematomas may be considered in the differential diagnosis.                              X-Rays:   Independently Interpreted Readings:   Other Readings:  X-ray left hand with soft tissue swelling without evidence of bony erosion, foreign body, or fracture    Medical Decision Making:   Clinical Tests:   Lab Tests: Ordered and Reviewed  Radiological Study: Ordered and Reviewed  Medical Tests: Ordered and Reviewed  ED Management:  49-year-old male with history and exam concerning for cellulitis with an abscess to the left hand and wrist.  Possibly from wounds while working in "[employer]ing, although, patient does admit to remote history of IV drug use.  He presents tachycardic and febrile in severe pain.  Treated for sepsis with IV fluids, and broad-spectrum antibiotics.  Blood and wound cultures obtained prior to antibiotics.  No leukocytosis or lactic acidosis.  Abscess to dorsal left hand incised and drained with copious amount of pus.  At this time there is no convincing evidence for necrotizing fasciitis or osteo.  X-ray without foreign body or fracture.  Patient's pain  managed with Dilaudid in the ED.  I discussed the case to with orthopedic surgeon Dr. Maciel, and will place in observation under orthopedic services.  Case also discussed with ED attending Dr. Wheeler, who also evaluated the patient.            Scribe Attestation:   Scribe #1: I performed the above scribed service and the documentation accurately describes the services I performed. I attest to the accuracy of the note.                          Clinical Impression:       ICD-10-CM ICD-9-CM   1. Cellulitis and abscess of hand, except fingers and thumb L03.119 682.4    L02.519    2. Swelling of left hand M79.89 729.81   3. Sepsis A41.9 038.9     995.91             ED Disposition Condition    Observation            Scribe attestation: I, Brian Reyes, personally performed the services described in this documentation. All medical record entries made by the scribe were at my direction and in my presence. I have reviewed the chart and agree that the record reflects my personal performance and is accurate and complete.               Brian Reyes, PAKendalC  03/09/20 6504

## 2020-03-09 NOTE — PROGRESS NOTES
Pt arrived to unit via w/c from ED. Pt AAOX3. No c/o pain. Pt requesting something to eat and drink. Juice and sandwich provided. Pt instructed on NPO status after midnight. SR up x2. Bed in low position. Call light in reach. Left hand dressing intact, small amount of dry blood noted to ace wrap. No distress noted. Will continue to monitor.

## 2020-03-09 NOTE — ED TRIAGE NOTES
Patient reports spider bite to left hand that happened on Saturday. Now reports pain and swelling to the area. Reports taking Tylenol for the pain, last taken at midnight.

## 2020-03-10 ENCOUNTER — ANESTHESIA EVENT (OUTPATIENT)
Dept: SURGERY | Facility: HOSPITAL | Age: 50
End: 2020-03-10
Payer: MEDICAID

## 2020-03-10 ENCOUNTER — ANESTHESIA (OUTPATIENT)
Dept: SURGERY | Facility: HOSPITAL | Age: 50
End: 2020-03-10
Payer: MEDICAID

## 2020-03-10 PROCEDURE — 87186 SC STD MICRODIL/AGAR DIL: CPT

## 2020-03-10 PROCEDURE — 87206 SMEAR FLUORESCENT/ACID STAI: CPT

## 2020-03-10 PROCEDURE — 00400 ANES INTEGUMENTARY SYS NOS: CPT | Performed by: ORTHOPAEDIC SURGERY

## 2020-03-10 PROCEDURE — 63600175 PHARM REV CODE 636 W HCPCS: Performed by: NURSE ANESTHETIST, CERTIFIED REGISTERED

## 2020-03-10 PROCEDURE — 87075 CULTR BACTERIA EXCEPT BLOOD: CPT

## 2020-03-10 PROCEDURE — 63600175 PHARM REV CODE 636 W HCPCS: Performed by: PHYSICIAN ASSISTANT

## 2020-03-10 PROCEDURE — G0378 HOSPITAL OBSERVATION PER HR: HCPCS

## 2020-03-10 PROCEDURE — 87205 SMEAR GRAM STAIN: CPT

## 2020-03-10 PROCEDURE — 87116 MYCOBACTERIA CULTURE: CPT

## 2020-03-10 PROCEDURE — 36000705 HC OR TIME LEV I EA ADD 15 MIN: Performed by: ORTHOPAEDIC SURGERY

## 2020-03-10 PROCEDURE — 87102 FUNGUS ISOLATION CULTURE: CPT

## 2020-03-10 PROCEDURE — 99204 OFFICE O/P NEW MOD 45 MIN: CPT | Mod: ,,, | Performed by: PHYSICIAN ASSISTANT

## 2020-03-10 PROCEDURE — 25000003 PHARM REV CODE 250: Performed by: NURSE ANESTHETIST, CERTIFIED REGISTERED

## 2020-03-10 PROCEDURE — D9220A PRA ANESTHESIA: Mod: ANES,,, | Performed by: ANESTHESIOLOGY

## 2020-03-10 PROCEDURE — 87070 CULTURE OTHR SPECIMN AEROBIC: CPT

## 2020-03-10 PROCEDURE — 36000704 HC OR TIME LEV I 1ST 15 MIN: Performed by: ORTHOPAEDIC SURGERY

## 2020-03-10 PROCEDURE — 63600175 PHARM REV CODE 636 W HCPCS: Performed by: ANESTHESIOLOGY

## 2020-03-10 PROCEDURE — 37000009 HC ANESTHESIA EA ADD 15 MINS: Performed by: ORTHOPAEDIC SURGERY

## 2020-03-10 PROCEDURE — 71000033 HC RECOVERY, INTIAL HOUR: Performed by: ORTHOPAEDIC SURGERY

## 2020-03-10 PROCEDURE — D9220A PRA ANESTHESIA: Mod: CRNA,,, | Performed by: NURSE ANESTHETIST, CERTIFIED REGISTERED

## 2020-03-10 PROCEDURE — 37000008 HC ANESTHESIA 1ST 15 MINUTES: Performed by: ORTHOPAEDIC SURGERY

## 2020-03-10 PROCEDURE — 99204 PR OFFICE/OUTPT VISIT, NEW, LEVL IV, 45-59 MIN: ICD-10-PCS | Mod: ,,, | Performed by: PHYSICIAN ASSISTANT

## 2020-03-10 PROCEDURE — D9220A PRA ANESTHESIA: ICD-10-PCS | Mod: CRNA,,, | Performed by: NURSE ANESTHETIST, CERTIFIED REGISTERED

## 2020-03-10 PROCEDURE — 63600175 PHARM REV CODE 636 W HCPCS: Performed by: ORTHOPAEDIC SURGERY

## 2020-03-10 PROCEDURE — 96376 TX/PRO/DX INJ SAME DRUG ADON: CPT

## 2020-03-10 PROCEDURE — D9220A PRA ANESTHESIA: ICD-10-PCS | Mod: ANES,,, | Performed by: ANESTHESIOLOGY

## 2020-03-10 RX ORDER — MIDAZOLAM HYDROCHLORIDE 1 MG/ML
INJECTION, SOLUTION INTRAMUSCULAR; INTRAVENOUS
Status: DISCONTINUED | OUTPATIENT
Start: 2020-03-10 | End: 2020-03-10

## 2020-03-10 RX ORDER — HYDROMORPHONE HYDROCHLORIDE 2 MG/ML
0.2 INJECTION, SOLUTION INTRAMUSCULAR; INTRAVENOUS; SUBCUTANEOUS EVERY 5 MIN PRN
Status: DISCONTINUED | OUTPATIENT
Start: 2020-03-10 | End: 2020-03-10 | Stop reason: HOSPADM

## 2020-03-10 RX ORDER — LIDOCAINE HYDROCHLORIDE 20 MG/ML
INJECTION INTRAVENOUS
Status: DISCONTINUED | OUTPATIENT
Start: 2020-03-10 | End: 2020-03-10

## 2020-03-10 RX ORDER — PROPOFOL 10 MG/ML
VIAL (ML) INTRAVENOUS
Status: DISCONTINUED | OUTPATIENT
Start: 2020-03-10 | End: 2020-03-10

## 2020-03-10 RX ORDER — FENTANYL CITRATE 50 UG/ML
25 INJECTION, SOLUTION INTRAMUSCULAR; INTRAVENOUS EVERY 5 MIN PRN
Status: DISCONTINUED | OUTPATIENT
Start: 2020-03-10 | End: 2020-03-10 | Stop reason: HOSPADM

## 2020-03-10 RX ORDER — FENTANYL CITRATE 50 UG/ML
INJECTION, SOLUTION INTRAMUSCULAR; INTRAVENOUS
Status: DISCONTINUED | OUTPATIENT
Start: 2020-03-10 | End: 2020-03-10

## 2020-03-10 RX ORDER — ACETAMINOPHEN 10 MG/ML
1000 INJECTION, SOLUTION INTRAVENOUS ONCE
Status: COMPLETED | OUTPATIENT
Start: 2020-03-10 | End: 2020-03-10

## 2020-03-10 RX ORDER — KETAMINE HYDROCHLORIDE 100 MG/ML
INJECTION, SOLUTION INTRAMUSCULAR; INTRAVENOUS
Status: DISCONTINUED | OUTPATIENT
Start: 2020-03-10 | End: 2020-03-10

## 2020-03-10 RX ORDER — SODIUM CHLORIDE 0.9 % (FLUSH) 0.9 %
3 SYRINGE (ML) INJECTION
Status: DISCONTINUED | OUTPATIENT
Start: 2020-03-10 | End: 2020-03-10 | Stop reason: HOSPADM

## 2020-03-10 RX ADMIN — VANCOMYCIN HYDROCHLORIDE 1000 MG: 1 INJECTION, POWDER, LYOPHILIZED, FOR SOLUTION INTRAVENOUS at 01:03

## 2020-03-10 RX ADMIN — FENTANYL CITRATE 50 MCG: 50 INJECTION INTRAMUSCULAR; INTRAVENOUS at 02:03

## 2020-03-10 RX ADMIN — PROPOFOL 50 MG: 10 INJECTION, EMULSION INTRAVENOUS at 02:03

## 2020-03-10 RX ADMIN — KETAMINE HYDROCHLORIDE 10 MG: 100 INJECTION, SOLUTION, CONCENTRATE INTRAMUSCULAR; INTRAVENOUS at 02:03

## 2020-03-10 RX ADMIN — HYDROMORPHONE HYDROCHLORIDE 0.2 MG: 2 INJECTION, SOLUTION INTRAMUSCULAR; INTRAVENOUS; SUBCUTANEOUS at 03:03

## 2020-03-10 RX ADMIN — FENTANYL CITRATE 25 MCG: 50 INJECTION, SOLUTION INTRAMUSCULAR; INTRAVENOUS at 03:03

## 2020-03-10 RX ADMIN — HYDROMORPHONE HYDROCHLORIDE 1 MG: 2 INJECTION, SOLUTION INTRAMUSCULAR; INTRAVENOUS; SUBCUTANEOUS at 06:03

## 2020-03-10 RX ADMIN — PROPOFOL 100 MG: 10 INJECTION, EMULSION INTRAVENOUS at 02:03

## 2020-03-10 RX ADMIN — PIPERACILLIN AND TAZOBACTAM 4.5 G: 4; .5 INJECTION, POWDER, FOR SOLUTION INTRAVENOUS at 04:03

## 2020-03-10 RX ADMIN — MIDAZOLAM HYDROCHLORIDE 2 MG: 1 INJECTION, SOLUTION INTRAMUSCULAR; INTRAVENOUS at 02:03

## 2020-03-10 RX ADMIN — ACETAMINOPHEN 1000 MG: 10 INJECTION, SOLUTION INTRAVENOUS at 03:03

## 2020-03-10 RX ADMIN — KETAMINE HYDROCHLORIDE 20 MG: 100 INJECTION, SOLUTION, CONCENTRATE INTRAMUSCULAR; INTRAVENOUS at 02:03

## 2020-03-10 RX ADMIN — PIPERACILLIN AND TAZOBACTAM 4.5 G: 4; .5 INJECTION, POWDER, FOR SOLUTION INTRAVENOUS at 02:03

## 2020-03-10 RX ADMIN — Medication 100 MG: at 02:03

## 2020-03-10 RX ADMIN — HYDROMORPHONE HYDROCHLORIDE 1 MG: 2 INJECTION, SOLUTION INTRAMUSCULAR; INTRAVENOUS; SUBCUTANEOUS at 10:03

## 2020-03-10 RX ADMIN — VANCOMYCIN HYDROCHLORIDE 1000 MG: 1 INJECTION, POWDER, LYOPHILIZED, FOR SOLUTION INTRAVENOUS at 02:03

## 2020-03-10 RX ADMIN — PIPERACILLIN AND TAZOBACTAM 4.5 G: 4; .5 INJECTION, POWDER, FOR SOLUTION INTRAVENOUS at 08:03

## 2020-03-10 NOTE — PROGRESS NOTES
Pharmacokinetic Initial Assessment: IV Vancomycin    Assessment/Plan:    Initiate intravenous vancomycin with loading dose of 1250 mg once followed by a maintenance dose of vancomycin 1000 mg IV every 12 hours  Desired empiric serum trough concentration is 10 to 20 mcg/mL  Draw vancomycin trough level 30 min prior to fourth dose on 03/11/2020 at approximately 00:30   Pharmacy will continue to follow and monitor vancomycin.      Please contact pharmacy at extension [phone] with any questions regarding this assessment.     Thank you for the consult,   Edith Brooks       Patient brief summary:  Max Mcgregor is a 49 y.o. male initiated on antimicrobial therapy with IV Vancomycin for treatment of suspected skin & soft tissue infection    Drug Allergies:   Review of patient's allergies indicates:  No Known Allergies    Actual Body Weight:   77.1 kg    Renal Function:   Estimated Creatinine Clearance: 91.8 mL/min (based on SCr of 0.9 mg/dL).,     Dialysis Method (if applicable):  N/A    CBC (last 72 hours):  Recent Labs   Lab Result Units 03/09/20  1143   WBC K/uL 9.15   Hemoglobin g/dL 12.7*   Hematocrit % 39.2*   Platelets K/uL 303   Gran% % 74.2*   Lymph% % 16.1*   Mono% % 8.6   Eosinophil% % 0.7   Basophil% % 0.1   Differential Method  Automated       Metabolic Panel (last 72 hours):  Recent Labs   Lab Result Units 03/09/20  1156 03/09/20  1415   Sodium mmol/L 135*  --    Potassium mmol/L 4.5  --    Chloride mmol/L 105  --    CO2 mmol/L 18*  --    Glucose mg/dL 101  --    Glucose, UA   --  Negative   BUN, Bld mg/dL 12  --    Creatinine mg/dL 0.9  --    Creatinine, Random Ur mg/dL  --  61.8   Albumin g/dL 3.3*  --    Total Bilirubin mg/dL 0.4  --    Alkaline Phosphatase U/L 78  --    AST U/L 22  --    ALT U/L 12  --        Drug levels (last 3 results):  No results for input(s): VANCOMYCINRA, VANCOMYCINPE, VANCOMYCINTR in the last 72 hours.    Microbiologic Results:  Microbiology Results (last 7 days)       Procedure Component  Value Units Date/Time    Blood culture x two cultures. Draw prior to antibiotics. [[id number]] Collected:  03/09/20 1143    Order Status:  Completed Specimen:  Blood from Peripheral, Forearm, Right Updated:  03/09/20 1912     Blood Culture, Routine No Growth to date    Narrative:       Aerobic and anaerobic    Aerobic culture #1 [[id number]] Collected:  03/09/20 1258    Order Status:  Sent Specimen:  Abscess from Hand, Left Updated:  03/09/20 1305    Blood culture x two cultures. Draw prior to antibiotics. [[id number]] Collected:  03/09/20 1156    Order Status:  Sent Specimen:  Blood from Peripheral, Hand, Right Updated:  03/09/20 1215

## 2020-03-10 NOTE — NURSING
Surgical bath done and linen changed. IV antibiotics infusing. Cont IVF. 10/10 pain; prn pain med given. Pt remained free from fall/injury. Pt given sandwich, jaison crackers and apple juice. Pt aware of being NPO after MN. Safety measures maintained. Call light within reach

## 2020-03-10 NOTE — PLAN OF CARE
03/10/20 1522   Discharge Assessment   Assessment Type Discharge Planning Assessment   Confirmed/corrected address and phone number on facesheet? Yes   Assessment information obtained from? Patient   Prior to hospitilization cognitive status: Alert/Oriented   Prior to hospitalization functional status: Independent   Current cognitive status: Alert/Oriented   Current Functional Status: Independent   Facility Arrived From: Home    Lives With other relative(s)   Able to Return to Prior Arrangements yes   Is patient able to care for self after discharge? Yes   Who are your caregiver(s) and their phone number(s)? bina Relative [phone]      Patient's perception of discharge disposition home or selfcare   Readmission Within the Last 30 Days no previous admission in last 30 days   Patient currently being followed by outpatient case management? No   Patient currently receives any other outside agency services? No   Equipment Currently Used at Home none   Do you have any problems affording any of your prescribed medications? No   Is the patient taking medications as prescribed? yes   Does the patient have transportation home? Yes   Transportation Anticipated family or friend will provide   Dialysis Name and Scheduled days N/A    Does the patient receive services at the [facility]? No   Discharge Plan A Home with family;Home Health   Discharge Plan B Home   DME Needed Upon Discharge  none   Patient/Family in Agreement with Plan yes

## 2020-03-10 NOTE — PROGRESS NOTES
MRI shows no definitive abscess in joint, obvious abscess dorsal soft tissue, ext tendons. Chronic changes seen in wrist.  Plan is I&D today (Pt refused last night), wash out joint if sinus track found into joint.

## 2020-03-10 NOTE — TRANSFER OF CARE
"Anesthesia Transfer of Care Note    Patient: Max Mcgregor    Procedure(s) Performed: Procedure(s) (LRB):  INCISION AND DRAINAGE, WRIST (Left)    Patient location: PACU    Anesthesia Type: general    Transport from OR: Transported from OR on room air with adequate spontaneous ventilation    Post pain: adequate analgesia    Post assessment: no apparent anesthetic complications    Post vital signs: stable    Level of consciousness: sedated    Nausea/Vomiting: no nausea/vomiting    Complications: none    Transfer of care protocol was followed      Last vitals:   Visit Vitals  BP (!) 128/101 (BP Location: Right arm, Patient Position: Lying)   Pulse 91   Temp 36.7 °C (98.1 °F) (Oral)   Resp 18   Ht 5' 7" (1.702 m)   Wt 77.1 kg (170 lb)   SpO2 99%   BMI 26.63 kg/m²     "

## 2020-03-10 NOTE — HPI
PT is a 49-year-old male who presented w/ 3-4 day history of swelling and pain left dorsal wrist.  Patient believes it was a spider bite because he is seen many spiders around his home but does not recall an actual bite.  I&D done by MAICOL STALLWORTH which produced copious amounts of purulent material.  Cx pending.  Pt w/ hx of IVDU denies recent use but tox screen positive for opiates and cocaine.  Pt denies F or chills or additional symptoms.    MRI-Incomplete examination.  There is diffuse edema about the dorsal aspect of the hand status post incision and drainage of presumed abscesses.  There are scattered regions of high T2 signal within the capitate, lunate, and triquetrum.  This may reflect sequela of prior injury/degenerative change however developing osseous infection is not excluded.  An additional focus is noted within the dorsal aspect of the proximal 3rd metacarpal.  Correlation is advised, consider completing the full exam once patient is better able a tolerate for more definitive evaluation of the above.

## 2020-03-10 NOTE — PLAN OF CARE
Vital signd stable   Pt rates pain to left hand 5/10  Dressing to to left hand - CDI  Pt denies N/V

## 2020-03-10 NOTE — OP NOTE
[facility]  General Surgery  Operative Note    SUMMARY     Date of Procedure: 3/10/2020     Procedure: Procedure(s) (LRB):  INCISION AND DRAINAGE, WRIST (Left)       Surgeon(s) and Role:     * Asa Maciel MD - Primary    Assisting Surgeon: None    Pre-Operative Diagnosis: Swelling of left hand [M79.89]    Post-Operative Diagnosis: Post-Op Diagnosis Codes:     * Swelling of left hand [M79.89]    Procedure excisional debridement and irrigation left wrist, cultures    Anesthesia: General    Description of the Procedure, Significant Surgical Tasks, Conducted by the Assistant(s), if Applicable, Complications, Estimated Blood Loss (EBL), Condition, Disposition:    After obtaining proper informed consent from the patient, he was taken to the operating room and given general anesthesia.  Left arm was prepped and draped in usual sterile fashion.  Tourniquet was applied to the left upper arm inflated to 250 mm of mercury for 15 min during the case.    Left hand had 2 previous incisions from the emergency room.  One was opened longitudinally with care taken to protect neurovascular structures.  Purulence material and tissue was obtained and excised and sent for culture.  The tourniquet was released hemostasis obtained using electrocautery.  The infection did not appear to involve the tendons, the tendons were exposed through the retinaculum and did not have any period of material present.  The wound was irrigated with normal saline and Betadine.  Iodoform gauze was packed in the wound.  The wound was partially closed with 3 0 running nylon suture             Implants: * No implants in log *    Specimens:   Specimen (12h ago, onward)    None                          Attestation: I was present and scrubbed for the entire procedure.

## 2020-03-10 NOTE — SUBJECTIVE & OBJECTIVE
"Past Medical History:   Diagnosis Date    Cellulitis and abscess of hand 03/09/2020    Left dorsal side, I&D done 3/9/20    Cigarette smoker     History of drug use     cocaine & IV heroin       Past Surgical History:   Procedure Laterality Date    NO PAST SURGERIES  03/09/2020       Review of patient's allergies indicates:  No Known Allergies    Medications:  No medications prior to admission.     Antibiotics (From admission, onward)    Start     Stop Route Frequency Ordered    03/10/20 0100  vancomycin in dextrose 5 % 1 gram/250 mL IVPB 1,000 mg      -- IV Every 12 hours (non-standard times) 03/09/20 2004 03/09/20 2100  piperacillin-tazobactam 4.5 g in sodium chloride 0.9% 100 mL IVPB (ready to mix system)      -- IV Every 8 hours (non-standard times) 03/09/20 1938        Antifungals (From admission, onward)    None        Antivirals (From admission, onward)    None           Immunization History   Administered Date(s) Administered    Tdap 03/09/2020       Family History     None        Social History     Socioeconomic History    Marital status: Single     Spouse name: Not on file    Number of children: Not on file    Years of education: Not on file    Highest education level: Not on file   Occupational History    Not on file   Social Needs    Financial resource strain: Not on file    Food insecurity:     Worry: Not on file     Inability: Not on file    Transportation needs:     Medical: Not on file     Non-medical: Not on file   Tobacco Use    Smoking status: Current Every Day Smoker     Packs/day: 0.50     Types: Cigarettes    Smokeless tobacco: Never Used   Substance and Sexual Activity    Alcohol use: Yes     Comment: 6 pack of beer per week    Drug use: Not Currently     Types: Heroin, "Crack" cocaine     Comment: 3/9/20:  IV heroin     Sexual activity: Yes     Partners: Female   Lifestyle    Physical activity:     Days per week: Not on file     Minutes per session: Not on file    " Stress: Not on file   Relationships    Social connections:     Talks on phone: Not on file     Gets together: Not on file     Attends [employer] service: Not on file     Active member of club or organization: Not on file     Attends meetings of clubs or organizations: Not on file     Relationship status: Not on file   Other Topics Concern    Not on file   Social History Narrative    Not on file     Review of Systems   Unable to perform ROS: Other (Pt sleeping and uncooperative)   Constitutional: Negative for chills and fever.   Skin: Positive for wound.     Objective:     Vital Signs (Most Recent):  Temp: 98.1 °F (36.7 °C) (03/10/20 1509)  Pulse: 91 (03/10/20 1509)  Resp: 18 (03/10/20 1509)  BP: (!) 128/101 (03/10/20 1509)  SpO2: 99 % (03/10/20 1509) Vital Signs (24h Range):  Temp:  [98.1 °F (36.7 °C)-98.6 °F (37 °C)] 98.1 °F (36.7 °C)  Pulse:  [54-91] 91  Resp:  [16-18] 18  SpO2:  [96 %-99 %] 99 %  BP: (123-134)/() 128/101     Weight: 77.1 kg (170 lb)  Body mass index is 26.63 kg/m².    Estimated Creatinine Clearance: 91.8 mL/min (based on SCr of 0.9 mg/dL).    Physical Exam   Constitutional: He appears well-developed and well-nourished. No distress.   Cardiovascular: Normal rate, regular rhythm and normal heart sounds.   No murmur heard.  Pulmonary/Chest: No respiratory distress. He has no wheezes. He has no rales.   Abdominal: He exhibits no distension and no mass. There is no guarding.   Skin: He is not diaphoretic.   L wrist wrapped       Significant Labs: All pertinent labs within the past 24 hours have been reviewed.    Significant Imaging: I have reviewed all pertinent imaging results/findings within the past 24 hours.

## 2020-03-10 NOTE — ANESTHESIA PREPROCEDURE EVALUATION
"                                                                                                             03/10/2020  Max Mcgregor is a 49 y.o., male.    Pre-operative evaluation for Procedure(s) (LRB):  INCISION AND DRAINAGE, WRIST (Left)    Max Mcgregor is a 49 y.o. male     Denies CP/SOB/GERD/MI/CVA/URI symptoms.  METS > 4  NPO > 8    Patient Active Problem List   Diagnosis    Cellulitis and abscess of hand, except fingers and thumb       Review of patient's allergies indicates:  No Known Allergies    No current facility-administered medications on file prior to encounter.      No current outpatient medications on file prior to encounter.       Past Surgical History:   Procedure Laterality Date    NO PAST SURGERIES  03/09/2020       Social History     Socioeconomic History    Marital status: Single     Spouse name: Not on file    Number of children: Not on file    Years of education: Not on file    Highest education level: Not on file   Occupational History    Not on file   Social Needs    Financial resource strain: Not on file    Food insecurity:     Worry: Not on file     Inability: Not on file    Transportation needs:     Medical: Not on file     Non-medical: Not on file   Tobacco Use    Smoking status: Current Every Day Smoker     Packs/day: 0.50     Types: Cigarettes    Smokeless tobacco: Never Used   Substance and Sexual Activity    Alcohol use: Yes     Comment: 6 pack of beer per week    Drug use: Not Currently     Types: Heroin, "Crack" cocaine     Comment: 3/9/20:  IV heroin     Sexual activity: Yes     Partners: Female   Lifestyle    Physical activity:     Days per week: Not on file     Minutes per session: Not on file    Stress: Not on file   Relationships    Social connections:     Talks on phone: Not on file     Gets together: Not on file     Attends [employer] service: Not on file     Active member of club or organization: Not on file     Attends meetings of clubs or " organizations: Not on file     Relationship status: Not on file   Other Topics Concern    Not on file   Social History Narrative    Not on file         CBC:   Recent Labs     03/09/20  1143   WBC 9.15   RBC 4.89   HGB 12.7*   HCT 39.2*      MCV 80*   MCH 26.0*   MCHC 32.4       CMP:   Recent Labs     03/09/20  1156   *   K 4.5      CO2 18*   BUN 12   CREATININE 0.9      CALCIUM 9.1   ALBUMIN 3.3*   PROT 8.4   ALKPHOS 78   ALT 12   AST 22   BILITOT 0.4       INR  No results for input(s): PT, INR, PROTIME, APTT in the last 72 hours.      Vitals:    03/10/20 0726   BP: 123/72   Pulse: 67   Resp: 17   Temp: 36.9 °C (98.5 °F)     See nursing charting for additional vital signs      Diagnostic Studies:  Results for VALARIE DELACRUZ (MRN [id number]) as of 3/10/2020 08:53   Ref. Range 3/9/2020 11:43   WBC Latest Ref Range: 3.90 - 12.70 K/uL 9.15   RBC Latest Ref Range: 4.60 - 6.20 M/uL 4.89   Hemoglobin Latest Ref Range: 14.0 - 18.0 g/dL 12.7 (L)   Hematocrit Latest Ref Range: 40.0 - 54.0 % 39.2 (L)   MCV Latest Ref Range: 82 - 98 fL 80 (L)   MCH Latest Ref Range: 27.0 - 31.0 pg 26.0 (L)   MCHC Latest Ref Range: 32.0 - 36.0 g/dL 32.4   RDW Latest Ref Range: 11.5 - 14.5 % 14.0   Platelets Latest Ref Range: 150 - 350 K/uL 303   MPV Latest Ref Range: 9.2 - 12.9 fL 13.1 (H)   Gran% Latest Ref Range: 38.0 - 73.0 % 74.2 (H)   Gran # (ANC) Latest Ref Range: 1.8 - 7.7 K/uL 6.8   Lymph% Latest Ref Range: 18.0 - 48.0 % 16.1 (L)   Lymph # Latest Ref Range: 1.0 - 4.8 K/uL 1.5   Mono% Latest Ref Range: 4.0 - 15.0 % 8.6   Mono # Latest Ref Range: 0.3 - 1.0 K/uL 0.8   Eosinophil% Latest Ref Range: 0.0 - 8.0 % 0.7   Eos # Latest Ref Range: 0.0 - 0.5 K/uL 0.1   Basophil% Latest Ref Range: 0.0 - 1.9 % 0.1   Baso # Latest Ref Range: 0.00 - 0.20 K/uL 0.01   nRBC Latest Ref Range: 0 /100 WBC 0   Differential Method Unknown Automated   Immature Grans (Abs) Latest Ref Range: 0.00 - 0.04 K/uL 0.03   Immature Granulocytes  Latest Ref Range: 0.0 - 0.5 % 0.3     Results for VALARIE DELACRUZ (MRN [id number]) as of 3/10/2020 08:53   Ref. Range 3/9/2020 11:56   Sodium Latest Ref Range: 136 - 145 mmol/L 135 (L)   Potassium Latest Ref Range: 3.5 - 5.1 mmol/L 4.5   Chloride Latest Ref Range: 95 - 110 mmol/L 105   CO2 Latest Ref Range: 23 - 29 mmol/L 18 (L)   Anion Gap Latest Ref Range: 8 - 16 mmol/L 12   BUN, Bld Latest Ref Range: 6 - 20 mg/dL 12   Creatinine Latest Ref Range: 0.5 - 1.4 mg/dL 0.9   eGFR if non African American Latest Ref Range: >60 mL/min/1.73 m^2 >60   eGFR if  Latest Ref Range: >60 mL/min/1.73 m^2 >60   Glucose Latest Ref Range: 70 - 110 mg/dL 101   Calcium Latest Ref Range: 8.7 - 10.5 mg/dL 9.1   Alkaline Phosphatase Latest Ref Range: 55 - 135 U/L 78   PROTEIN TOTAL Latest Ref Range: 6.0 - 8.4 g/dL 8.4   Albumin Latest Ref Range: 3.5 - 5.2 g/dL 3.3 (L)   BILIRUBIN TOTAL Latest Ref Range: 0.1 - 1.0 mg/dL 0.4   AST Latest Ref Range: 10 - 40 U/L 22   ALT Latest Ref Range: 10 - 44 U/L 12   CRP Latest Ref Range: 0.0 - 8.2 mg/L 65.7 (H)     EKG (3/9/20):  Normal sinus rhythm  Normal ECG    Anesthesia Evaluation    I have reviewed the Patient Summary Reports.    I have reviewed the Nursing Notes.      Review of Systems  Anesthesia Hx:  No previous Anesthesia    Social:  Smoker, Alcohol Use Pt endorses drinking 6-pack per day but denies additional illicit drug use & denies use of pain Rx meds.   Hematology/Oncology:         -- Anemia:   Cardiovascular:  Cardiovascular Normal Exercise tolerance: good  ECG has been reviewed.    Pulmonary:  Pulmonary Normal    Hepatic/GI:  Hepatic/GI Normal    Neurological:  Neurology Normal    Endocrine:  Endocrine Normal        Physical Exam  General:  Well nourished    Airway/Jaw/Neck:  AIRWAY FINDINGS: Normal      Chest/Lungs:  Chest/Lungs Clear    Heart/Vascular:  Heart Findings: Normal       Mental Status:  Mental Status Findings: Normal        Anesthesia Plan  Type of Anesthesia,  risks & benefits discussed:  Anesthesia Type:  general  Patient's Preference:   Intra-op Monitoring Plan: standard ASA monitors  Intra-op Monitoring Plan Comments:   Post Op Pain Control Plan:   Post Op Pain Control Plan Comments:   Induction:   IV  Beta Blocker:  Patient is not currently on a Beta-Blocker (No further documentation required).       Informed Consent: Patient understands risks and agrees with Anesthesia plan.  Questions answered. Anesthesia consent signed with patient.  ASA Score: 2     Day of Surgery Review of History & Physical:    H&P update referred to the provider.         Ready For Surgery From Anesthesia Perspective.

## 2020-03-10 NOTE — CONSULTS
[facility]  Infectious Disease  Consult Note    Patient Name: Max Mcgregor  MRN: [id number]  Admission Date: 3/9/2020  Hospital Length of Stay: 0 days  Attending Physician: Asa Maciel MD  Primary Care Provider: Primary Doctor No     Isolation Status: No active isolations    Patient information was obtained from patient, past medical records and ER records.      Inpatient consult to Infectious Diseases  Consult performed by: Ramón Holley PA-C  Consult ordered by: Asa Maciel MD        Assessment/Plan:     Cellulitis and abscess of hand, except fingers and thumb  PT is a 49-year-old male who presented w/ 3-4 day history of swelling and pain left dorsal wrist.  Patient believes it was a spider bite because he is seen many spiders around his home but does not recall an actual bite.  I&D done by ER PA which produced copious amounts of purulent material.  Cx pending.  Pt w/ hx of IVDU denies recent use but tox screen positive for opiates and cocaine.  Pt denies F or chills or additional symptoms.    MRI-Incomplete examination.  There is diffuse edema about the dorsal aspect of the hand status post incision and drainage of presumed abscesses.  There are scattered regions of high T2 signal within the capitate, lunate, and triquetrum.  This may reflect sequela of prior injury/degenerative change however developing osseous infection is not excluded.  An additional focus is noted within the dorsal aspect of the proximal 3rd metacarpal.  Correlation is advised, consider completing the full exam once patient is better able a tolerate for more definitive evaluation of the above.    Pt taken to OR today for I and D by Ortho    Plan  -Continue vanc.  Pharmacy to dose.  Trough [id number]  -Discontinue Zosyn and start on Cefepime  -Will follow cxs results and tailor abx accordingly  -Follow ortho sx notes to determine if concern for bone infection  -ID will continue to  follow.        Thank you for your consult. I will follow-up with patient. Please contact us if you have any additional questions.    Ramón Holley PA-C  Infectious Disease  [facility] [place]    Subjective:     Principal Problem: <principal problem not specified>    HPI: PT is a 49-year-old male who presented w/ 3-4 day history of swelling and pain left dorsal wrist.  Patient believes it was a spider bite because he is seen many spiders around his home but does not recall an actual bite.  I&D done by ER PA which produced copious amounts of purulent material.  Cx pending.   Pt w/ hx of IVDU denies recent use but tox screen positive for opiates and cocaine.  Pt denies F or chills or additional symptoms.    MRI-Incomplete examination.  There is diffuse edema about the dorsal aspect of the hand status post incision and drainage of presumed abscesses.  There are scattered regions of high T2 signal within the capitate, lunate, and triquetrum.  This may reflect sequela of prior injury/degenerative change however developing osseous infection is not excluded.  An additional focus is noted within the dorsal aspect of the proximal 3rd metacarpal.  Correlation is advised, consider completing the full exam once patient is better able a tolerate for more definitive evaluation of the above.    Past Medical History:   Diagnosis Date    Cellulitis and abscess of hand 03/09/2020    Left dorsal side, I&D done 3/9/20    Cigarette smoker     History of drug use     cocaine & IV heroin       Past Surgical History:   Procedure Laterality Date    NO PAST SURGERIES  03/09/2020       Review of patient's allergies indicates:  No Known Allergies    Medications:  No medications prior to admission.     Antibiotics (From admission, onward)    Start     Stop Route Frequency Ordered    03/10/20 0100  vancomycin in dextrose 5 % 1 gram/250 mL IVPB 1,000 mg      -- IV Every 12 hours (non-standard times) 03/09/20 2004     "03/09/20 2100  piperacillin-tazobactam 4.5 g in sodium chloride 0.9% 100 mL IVPB (ready to mix system)      -- IV Every 8 hours (non-standard times) 03/09/20 1938        Antifungals (From admission, onward)    None        Antivirals (From admission, onward)    None           Immunization History   Administered Date(s) Administered    Tdap 03/09/2020       Family History     None        Social History     Socioeconomic History    Marital status: Single     Spouse name: Not on file    Number of children: Not on file    Years of education: Not on file    Highest education level: Not on file   Occupational History    Not on file   Social Needs    Financial resource strain: Not on file    Food insecurity:     Worry: Not on file     Inability: Not on file    Transportation needs:     Medical: Not on file     Non-medical: Not on file   Tobacco Use    Smoking status: Current Every Day Smoker     Packs/day: 0.50     Types: Cigarettes    Smokeless tobacco: Never Used   Substance and Sexual Activity    Alcohol use: Yes     Comment: 6 pack of beer per week    Drug use: Not Currently     Types: Heroin, "Crack" cocaine     Comment: 3/9/20:  IV heroin     Sexual activity: Yes     Partners: Female   Lifestyle    Physical activity:     Days per week: Not on file     Minutes per session: Not on file    Stress: Not on file   Relationships    Social connections:     Talks on phone: Not on file     Gets together: Not on file     Attends Judaism service: Not on file     Active member of club or organization: Not on file     Attends meetings of clubs or organizations: Not on file     Relationship status: Not on file   Other Topics Concern    Not on file   Social History Narrative    Not on file     Review of Systems   Unable to perform ROS: Other (Pt sleeping and uncooperative)   Constitutional: Negative for chills and fever.   Skin: Positive for wound.     Objective:     Vital Signs (Most Recent):  Temp: 98.1 °F " (36.7 °C) (03/10/20 1509)  Pulse: 91 (03/10/20 1509)  Resp: 18 (03/10/20 1509)  BP: (!) 128/101 (03/10/20 1509)  SpO2: 99 % (03/10/20 1509) Vital Signs (24h Range):  Temp:  [98.1 °F (36.7 °C)-98.6 °F (37 °C)] 98.1 °F (36.7 °C)  Pulse:  [54-91] 91  Resp:  [16-18] 18  SpO2:  [96 %-99 %] 99 %  BP: (123-134)/() 128/101     Weight: 77.1 kg (170 lb)  Body mass index is 26.63 kg/m².    Estimated Creatinine Clearance: 91.8 mL/min (based on SCr of 0.9 mg/dL).    Physical Exam   Constitutional: He appears well-developed and well-nourished. No distress.   Cardiovascular: Normal rate, regular rhythm and normal heart sounds.   No murmur heard.  Pulmonary/Chest: No respiratory distress. He has no wheezes. He has no rales.   Abdominal: He exhibits no distension and no mass. There is no guarding.   Skin: He is not diaphoretic.   L wrist wrapped       Significant Labs: All pertinent labs within the past 24 hours have been reviewed.    Significant Imaging: I have reviewed all pertinent imaging results/findings within the past 24 hours.

## 2020-03-11 LAB
GRAM STN SPEC: NORMAL
GRAM STN SPEC: NORMAL
VANCOMYCIN TROUGH SERPL-MCNC: 7.2 UG/ML (ref 10–22)

## 2020-03-11 PROCEDURE — 99214 OFFICE O/P EST MOD 30 MIN: CPT | Mod: ,,, | Performed by: PHYSICIAN ASSISTANT

## 2020-03-11 PROCEDURE — 63600175 PHARM REV CODE 636 W HCPCS: Performed by: ORTHOPAEDIC SURGERY

## 2020-03-11 PROCEDURE — S0028 INJECTION, FAMOTIDINE, 20 MG: HCPCS | Performed by: ORTHOPAEDIC SURGERY

## 2020-03-11 PROCEDURE — 25000003 PHARM REV CODE 250: Performed by: ORTHOPAEDIC SURGERY

## 2020-03-11 PROCEDURE — 80202 ASSAY OF VANCOMYCIN: CPT

## 2020-03-11 PROCEDURE — 36415 COLL VENOUS BLD VENIPUNCTURE: CPT

## 2020-03-11 PROCEDURE — 63600175 PHARM REV CODE 636 W HCPCS: Performed by: PHYSICIAN ASSISTANT

## 2020-03-11 PROCEDURE — 99214 PR OFFICE/OUTPT VISIT, EST, LEVL IV, 30-39 MIN: ICD-10-PCS | Mod: ,,, | Performed by: PHYSICIAN ASSISTANT

## 2020-03-11 PROCEDURE — G0378 HOSPITAL OBSERVATION PER HR: HCPCS

## 2020-03-11 RX ORDER — HYDROCODONE BITARTRATE AND ACETAMINOPHEN 5; 325 MG/1; MG/1
1 TABLET ORAL EVERY 6 HOURS PRN
Status: DISCONTINUED | OUTPATIENT
Start: 2020-03-11 | End: 2020-03-12 | Stop reason: HOSPADM

## 2020-03-11 RX ADMIN — VANCOMYCIN HYDROCHLORIDE 1250 MG: 1.25 INJECTION, POWDER, LYOPHILIZED, FOR SOLUTION INTRAVENOUS at 02:03

## 2020-03-11 RX ADMIN — PIPERACILLIN AND TAZOBACTAM 4.5 G: 4; .5 INJECTION, POWDER, FOR SOLUTION INTRAVENOUS at 04:03

## 2020-03-11 RX ADMIN — HYDROMORPHONE HYDROCHLORIDE 1 MG: 2 INJECTION, SOLUTION INTRAMUSCULAR; INTRAVENOUS; SUBCUTANEOUS at 10:03

## 2020-03-11 RX ADMIN — HYDROCODONE BITARTRATE AND ACETAMINOPHEN 1 TABLET: 5; 325 TABLET ORAL at 08:03

## 2020-03-11 RX ADMIN — SODIUM CHLORIDE: 0.9 INJECTION, SOLUTION INTRAVENOUS at 08:03

## 2020-03-11 RX ADMIN — CEFTRIAXONE SODIUM 2 G: 2 INJECTION, SOLUTION INTRAVENOUS at 10:03

## 2020-03-11 RX ADMIN — VANCOMYCIN HYDROCHLORIDE 1000 MG: 1 INJECTION, POWDER, LYOPHILIZED, FOR SOLUTION INTRAVENOUS at 01:03

## 2020-03-11 RX ADMIN — HYDROMORPHONE HYDROCHLORIDE 1 MG: 2 INJECTION, SOLUTION INTRAMUSCULAR; INTRAVENOUS; SUBCUTANEOUS at 02:03

## 2020-03-11 RX ADMIN — HYDROMORPHONE HYDROCHLORIDE 1 MG: 2 INJECTION, SOLUTION INTRAMUSCULAR; INTRAVENOUS; SUBCUTANEOUS at 06:03

## 2020-03-11 RX ADMIN — FAMOTIDINE 20 MG: 10 INJECTION INTRAVENOUS at 08:03

## 2020-03-11 RX ADMIN — HYDROCODONE BITARTRATE AND ACETAMINOPHEN 1 TABLET: 5; 325 TABLET ORAL at 02:03

## 2020-03-11 NOTE — ASSESSMENT & PLAN NOTE
"PT is a 49-year-old male who presented w/ 3-4 day history of swelling and pain left dorsal wrist.  Patient believes it was a spider bite because he is seen many spiders around his home but does not recall an actual bite.  I&D done by MAICOL STALLWORTH which produced copious amounts of purulent material. Viridans Strep.  Pt w/ hx of IVDU denies recent use but tox screen positive for opiates and cocaine.  Pt denies F or chills or additional symptoms.  ESR 78.  CRP 65.7    MRI-Incomplete examination.  There is diffuse edema about the dorsal aspect of the hand status post incision and drainage of presumed abscesses.      Pt taken to OR today for I and D by Ortho per notes "The infection did not appear to involve the tendons, the tendons were exposed through the retinaculum and did not have any period of material present."    Pt w/ hx of IVDU and non cooperation here not a good candidate for IV abx.    Plan  -Discontinue Vanc   -Continue Ceftriaxone for Strep Viridans  -Pt likely can be transitioned to Augmentin as an outpt.  Susceptibility and ID pending of Strep viridans  -Will follow cxs results and tailor abx accordingly  - Plan for 4 week treatment of PO abx w/ repeat labs and ID f/u to determine if additional abx needed  -ID will continue to follow w/ likely final recs tomorrow.  "

## 2020-03-11 NOTE — NURSING
Vanc Trough 7.2. Ok to give scheduled dose per pharmacy. IV antibiotics infusing. Will cont to monitor

## 2020-03-11 NOTE — PROGRESS NOTES
Progress Note  Orthopedics    Admit Date: 3/9/2020  Post-operative Day: 1 Day Post-Op  Hospital Day: 3    Follow-up For: Procedure(s) (LRB):  INCISION AND DRAINAGE, WRIST (Left)    SUBJECTIVE:     Max Mcgregor is 49 y.o. and is now 1 day post surgery. Pain is controlled with current analgesics.. He  He is ambulating.  Weight Bearing: WBAT    A&O. AFVSS. NVI L UE.  Dressing and packing removed L UE.  New sterile dressing applied.  Cultures growing Strep Viridans.  Sensitivities pending.    OBJECTIVE:     Vital Signs (Most Recent)  Temp: 98.1 °F (36.7 °C) (03/11/20 1107)  Pulse: (!) 54 (03/11/20 1107)  Resp: 17 (03/11/20 1107)  BP: 128/83 (03/11/20 1107)  SpO2: 98 % (03/11/20 1107)      Physical Exam:  Dressing: clean, dry and intact  Incision: no significant drainage, no significant erythema  DVT Evaluation: No evidence of DVT seen on physical exam.  Neurovascular: 5/5 motor strength, sensation intact, palpable pulses    Laboratory:  Recent Results (from the past 24 hour(s))   Gram stain    Collection Time: 03/10/20  2:45 PM   Result Value Ref Range    Gram Stain Result Few WBC's     Gram Stain Result No organisms seen    Aerobic culture    Collection Time: 03/10/20  2:45 PM   Result Value Ref Range    Aerobic Bacterial Culture No growth    VANCOMYCIN, TROUGH before 4th dose    Collection Time: 03/11/20 12:24 AM   Result Value Ref Range    Vancomycin-Trough 7.2 (L) 10.0 - 22.0 ug/mL         ASSESSMENT/PLAN:     Assessment: orthopedic stable  Status Post: I&D L wrist/hand    Plan: OK to d/c home from ortho standpoint once C&S are known and ID directs antibiotic regimen.  Would prefer po Abx in lieu of IV Abx if possible 2/2 IVDA history. Will order Q8H dressing changes L wrist/hand with Hibiclens and sterile 4X4s.  Con't the same once home.  RTC 1 week for wound check.

## 2020-03-11 NOTE — PLAN OF CARE
Problem: Adult Inpatient Plan of Care  Goal: Plan of Care Review  Outcome: Ongoing, Progressing  Flowsheets (Taken 3/11/2020 5925)  Plan of Care Reviewed With: patient   Pt free from falls, injury or any further trauma throughout shift. Pt AAO x 4. Continued medications as ordered. Complaints of pain, controlled with medications. Wound care TID. Awaiting sensitivity for abx recs from ID. Pt in no distress. Will cont to monitor.

## 2020-03-11 NOTE — NURSING
Pt transferred back to floor via stretcher. Pt requesting food, pain 8/10, but then falls asleep. Pt given snack until dinner arrives. Pt in no distress. Will cont to monitor.

## 2020-03-11 NOTE — PROGRESS NOTES
VANCOMYCIN DOSING BY PHARMACY DISCONTINUATION NOTE    Max Mcgregor is a 49 y.o. male who had been consulted for vancomycin dosing.    The pharmacy consult for vancomycin dosing has been discontinued.     Vancomycin Dosing by Pharmacy Consult will sign-off. Please reconsult if necessary. Thank you for allowing us to participate in this patient's care.     Diane Thorpe  [phone]

## 2020-03-11 NOTE — SUBJECTIVE & OBJECTIVE
Interval History: Pt afebrile w/o white count.  Pt w/o complaints.  Cx from I and D in the ED growing Strep Viridans.    Review of Systems   Constitutional: Negative for chills and fever.   Respiratory: Negative for cough and shortness of breath.    Cardiovascular: Negative for chest pain and leg swelling.   Gastrointestinal: Negative for abdominal distention, abdominal pain, constipation, diarrhea and nausea.   Genitourinary: Negative for difficulty urinating and dysuria.   Musculoskeletal: Negative for arthralgias, back pain, joint swelling and myalgias.   Skin: Positive for wound.     Objective:     Vital Signs (Most Recent):  Temp: 98.1 °F (36.7 °C) (03/11/20 1107)  Pulse: (!) 54 (03/11/20 1107)  Resp: 17 (03/11/20 1107)  BP: 128/83 (03/11/20 1107)  SpO2: 98 % (03/11/20 1107) Vital Signs (24h Range):  Temp:  [97.9 °F (36.6 °C)-98.5 °F (36.9 °C)] 98.1 °F (36.7 °C)  Pulse:  [54-94] 54  Resp:  [16-21] 17  SpO2:  [98 %-100 %] 98 %  BP: (119-133)/() 128/83     Weight: 77.1 kg (170 lb)  Body mass index is 26.63 kg/m².    Estimated Creatinine Clearance: 91.8 mL/min (based on SCr of 0.9 mg/dL).    Physical Exam   Constitutional: He appears well-developed and well-nourished. No distress.   Cardiovascular: Normal rate, regular rhythm and normal heart sounds.   No murmur heard.  Pulmonary/Chest: No respiratory distress. He has no wheezes. He has no rales.   Abdominal: He exhibits no distension and no mass. There is no guarding.   Skin: He is not diaphoretic.   L wrist wrapped       Significant Labs: All pertinent labs within the past 24 hours have been reviewed.    Significant Imaging: I have reviewed all pertinent imaging results/findings within the past 24 hours.

## 2020-03-11 NOTE — PROGRESS NOTES
"[facility]  Infectious Disease  Progress Note    Patient Name: Max Mcgregor  MRN: [id number]  Admission Date: 3/9/2020  Length of Stay: 0 days  Attending Physician: Asa Maciel MD  Primary Care Provider: Primary Doctor No    Isolation Status: No active isolations  Assessment/Plan:      * Cellulitis and abscess of hand, except fingers and thumb  PT is a 49-year-old male who presented w/ 3-4 day history of swelling and pain left dorsal wrist.  Patient believes it was a spider bite because he is seen many spiders around his home but does not recall an actual bite.  I&D done by ER PA which produced copious amounts of purulent material. Viridans Strep.  Pt w/ hx of IVDU denies recent use but tox screen positive for opiates and cocaine.  Pt denies F or chills or additional symptoms.  ESR 78.  CRP 65.7    MRI-Incomplete examination.  There is diffuse edema about the dorsal aspect of the hand status post incision and drainage of presumed abscesses.      Pt taken to OR today for I and D by Ortho per notes "The infection did not appear to involve the tendons, the tendons were exposed through the retinaculum and did not have any period of material present."    Pt w/ hx of IVDU and non cooperation here not a good candidate for IV abx.    Plan  -Discontinue Vanc   -Continue Ceftriaxone for Strep Viridans  -Pt likely can be transitioned to Augmentin as an outpt.  Susceptibility and ID pending of Strep viridans  -Will follow cxs results and tailor abx accordingly  - Plan for 4 week treatment of PO abx w/ repeat labs and ID f/u to determine if additional abx needed  -ID will continue to follow w/ likely final recs tomorrow.        Pt discussed with primary team and staff.  Thank you for your consult. I will follow-up with patient. Please contact us if you have any additional questions.    Ramón Holley PA-C  Infectious Disease  [facility]    Subjective:     Principal " Problem:Cellulitis and abscess of hand, except fingers and thumb    HPI: PT is a 49-year-old male who presented w/ 3-4 day history of swelling and pain left dorsal wrist.  Patient believes it was a spider bite because he is seen many spiders around his home but does not recall an actual bite.  I&D done by MAICOL STALLWORTH which produced copious amounts of purulent material.  Cx pending.   Pt w/ hx of IVDU denies recent use but tox screen positive for opiates and cocaine.  Pt denies F or chills or additional symptoms.    MRI-Incomplete examination.  There is diffuse edema about the dorsal aspect of the hand status post incision and drainage of presumed abscesses.  There are scattered regions of high T2 signal within the capitate, lunate, and triquetrum.  This may reflect sequela of prior injury/degenerative change however developing osseous infection is not excluded.  An additional focus is noted within the dorsal aspect of the proximal 3rd metacarpal.  Correlation is advised, consider completing the full exam once patient is better able a tolerate for more definitive evaluation of the above.  Interval History: Pt afebrile w/o white count.  Pt w/o complaints.  Cx from I and D in the ED growing Strep Viridans.    Review of Systems   Constitutional: Negative for chills and fever.   Respiratory: Negative for cough and shortness of breath.    Cardiovascular: Negative for chest pain and leg swelling.   Gastrointestinal: Negative for abdominal distention, abdominal pain, constipation, diarrhea and nausea.   Genitourinary: Negative for difficulty urinating and dysuria.   Musculoskeletal: Negative for arthralgias, back pain, joint swelling and myalgias.   Skin: Positive for wound.     Objective:     Vital Signs (Most Recent):  Temp: 98.1 °F (36.7 °C) (03/11/20 1107)  Pulse: (!) 54 (03/11/20 1107)  Resp: 17 (03/11/20 1107)  BP: 128/83 (03/11/20 1107)  SpO2: 98 % (03/11/20 1107) Vital Signs (24h Range):  Temp:  [97.9 °F (36.6 °C)-98.5 °F  (36.9 °C)] 98.1 °F (36.7 °C)  Pulse:  [54-94] 54  Resp:  [16-21] 17  SpO2:  [98 %-100 %] 98 %  BP: (119-133)/() 128/83     Weight: 77.1 kg (170 lb)  Body mass index is 26.63 kg/m².    Estimated Creatinine Clearance: 91.8 mL/min (based on SCr of 0.9 mg/dL).    Physical Exam   Constitutional: He appears well-developed and well-nourished. No distress.   Cardiovascular: Normal rate, regular rhythm and normal heart sounds.   No murmur heard.  Pulmonary/Chest: No respiratory distress. He has no wheezes. He has no rales.   Abdominal: He exhibits no distension and no mass. There is no guarding.   Skin: He is not diaphoretic.   L wrist wrapped       Significant Labs: All pertinent labs within the past 24 hours have been reviewed.    Significant Imaging: I have reviewed all pertinent imaging results/findings within the past 24 hours.

## 2020-03-11 NOTE — PROGRESS NOTES
Pharmacokinetic Assessment Follow Up: IV Vancomycin    Vancomycin serum concentration assessment(s):    The trough level was drawn correctly and can be used to guide therapy at this time. The measurement is below the desired definitive target range of 10 to 20 mcg/mL.    Vancomycin Regimen Plan:    Change regimen to Vancomycin 1250 mg IV every 12 hours with next serum trough concentration measured at 0100 prior to 4th dose on 3/13/2020     Drug levels (last 3 results):  Recent Labs   Lab Result Units 03/11/20  0024   Vancomycin-Trough ug/mL 7.2*       Pharmacy will continue to follow and monitor vancomycin.    Please contact pharmacy at extension [phone] for questions regarding this assessment.    Thank you for the consult,   Prince Polanco       Patient brief summary:  Max Mcgregor is a 49 y.o. male initiated on antimicrobial therapy with IV Vancomycin for treatment of skin & soft tissue infection    The patient's current regimen is Vancomycin 1250 mg q 12 hours     Drug Allergies:   Review of patient's allergies indicates:  No Known Allergies    Actual Body Weight: 77.1 kg    Renal Function:   Estimated Creatinine Clearance: 91.8 mL/min (based on SCr of 0.9 mg/dL).,     Dialysis Method (if applicable):  N/A    CBC (last 72 hours):  Recent Labs   Lab Result Units 03/09/20  1143   WBC K/uL 9.15   Hemoglobin g/dL 12.7*   Hematocrit % 39.2*   Platelets K/uL 303   Gran% % 74.2*   Lymph% % 16.1*   Mono% % 8.6   Eosinophil% % 0.7   Basophil% % 0.1   Differential Method  Automated       Metabolic Panel (last 72 hours):  Recent Labs   Lab Result Units 03/09/20  1156 03/09/20  1415   Sodium mmol/L 135*  --    Potassium mmol/L 4.5  --    Chloride mmol/L 105  --    CO2 mmol/L 18*  --    Glucose mg/dL 101  --    Glucose, UA   --  Negative   BUN, Bld mg/dL 12  --    Creatinine mg/dL 0.9  --    Creatinine, Random Ur mg/dL  --  61.8   Albumin g/dL 3.3*  --    Total Bilirubin mg/dL 0.4  --    Alkaline Phosphatase U/L 78  --     AST U/L 22  --    ALT U/L 12  --        Vancomycin Administrations:  vancomycin given in the last 96 hours                     vancomycin in dextrose 5 % 1 gram/250 mL IVPB 1,000 mg (mg) 1,000 mg New Bag 03/11/20 0130     1,000 mg New Bag 03/10/20 1319     1,000 mg New Bag  0233    vancomycin 1.25 g in dextrose 5% 250 mL IVPB (ready to mix) (mg) 1,250 mg New Bag 03/09/20 1259                      Microbiologic Results:  Microbiology Results (last 7 days)       Procedure Component Value Units Date/Time    Culture, Anaerobe [[id number]] Collected:  03/10/20 1445    Order Status:  Sent Specimen:  Wound from Wrist, Left Updated:  03/10/20 1525    AFB Culture & Smear [[id number]] Collected:  03/10/20 1445    Order Status:  Sent Specimen:  Wound from Wrist, Left Updated:  03/10/20 1524    Fungus culture [[id number]] Collected:  03/10/20 1445    Order Status:  Sent Specimen:  Wound from Wrist, Left Updated:  03/10/20 1524    Gram stain [[id number]] Collected:  03/10/20 1445    Order Status:  Sent Specimen:  Wound from Wrist, Left Updated:  03/10/20 1524    Aerobic culture [[id number]] Collected:  03/10/20 1445    Order Status:  Sent Specimen:  Wound from Wrist, Left Updated:  03/10/20 1523    Blood culture x two cultures. Draw prior to antibiotics. [[id number]] Collected:  03/09/20 1156    Order Status:  Completed Specimen:  Blood from Peripheral, Hand, Right Updated:  03/10/20 1503     Blood Culture, Routine No Growth to date      No Growth to date    Narrative:       Aerobic and anaerobic    Blood culture x two cultures. Draw prior to antibiotics. [[id number]] Collected:  03/09/20 1143    Order Status:  Completed Specimen:  Blood from Peripheral, Forearm, Right Updated:  03/10/20 1303     Blood Culture, Routine No Growth to date      No Growth to date    Narrative:       Aerobic and anaerobic    Aerobic culture #1 [[id number]] Collected:  03/09/20 1258    Order Status:  Completed Specimen:  Abscess from Hand, Left Updated:   03/10/20 0846     Aerobic Bacterial Culture Further report to follow    Narrative:       Culture #1

## 2020-03-12 VITALS
RESPIRATION RATE: 18 BRPM | HEART RATE: 57 BPM | DIASTOLIC BLOOD PRESSURE: 78 MMHG | HEIGHT: 67 IN | SYSTOLIC BLOOD PRESSURE: 128 MMHG | TEMPERATURE: 98 F | BODY MASS INDEX: 26.68 KG/M2 | WEIGHT: 170 LBS | OXYGEN SATURATION: 100 %

## 2020-03-12 LAB — BACTERIA SPEC AEROBE CULT: ABNORMAL

## 2020-03-12 PROCEDURE — 63600175 PHARM REV CODE 636 W HCPCS: Performed by: ORTHOPAEDIC SURGERY

## 2020-03-12 PROCEDURE — 97165 OT EVAL LOW COMPLEX 30 MIN: CPT

## 2020-03-12 PROCEDURE — G0378 HOSPITAL OBSERVATION PER HR: HCPCS

## 2020-03-12 PROCEDURE — 99214 OFFICE O/P EST MOD 30 MIN: CPT | Mod: ,,, | Performed by: PHYSICIAN ASSISTANT

## 2020-03-12 PROCEDURE — 63600175 PHARM REV CODE 636 W HCPCS: Performed by: PHYSICIAN ASSISTANT

## 2020-03-12 PROCEDURE — 25000003 PHARM REV CODE 250: Performed by: ORTHOPAEDIC SURGERY

## 2020-03-12 PROCEDURE — 99214 PR OFFICE/OUTPT VISIT, EST, LEVL IV, 30-39 MIN: ICD-10-PCS | Mod: ,,, | Performed by: PHYSICIAN ASSISTANT

## 2020-03-12 PROCEDURE — S0028 INJECTION, FAMOTIDINE, 20 MG: HCPCS | Performed by: ORTHOPAEDIC SURGERY

## 2020-03-12 RX ORDER — HYDROCODONE BITARTRATE AND ACETAMINOPHEN 5; 325 MG/1; MG/1
1 TABLET ORAL EVERY 6 HOURS PRN
Qty: 31 TABLET | Refills: 0 | Status: SHIPPED | OUTPATIENT
Start: 2020-03-12 | End: 2020-03-22

## 2020-03-12 RX ORDER — CEFPODOXIME PROXETIL 200 MG/1
400 TABLET, FILM COATED ORAL EVERY 12 HOURS
Qty: 84 TABLET | Refills: 0 | Status: SHIPPED | OUTPATIENT
Start: 2020-03-12 | End: 2020-04-02

## 2020-03-12 RX ADMIN — SODIUM CHLORIDE: 0.9 INJECTION, SOLUTION INTRAVENOUS at 08:03

## 2020-03-12 RX ADMIN — HYDROCODONE BITARTRATE AND ACETAMINOPHEN 1 TABLET: 5; 325 TABLET ORAL at 02:03

## 2020-03-12 RX ADMIN — CEFTRIAXONE SODIUM 2 G: 2 INJECTION, SOLUTION INTRAVENOUS at 09:03

## 2020-03-12 RX ADMIN — IBUPROFEN 600 MG: 600 TABLET, FILM COATED ORAL at 06:03

## 2020-03-12 RX ADMIN — MORPHINE SULFATE 2 MG: 10 INJECTION INTRAVENOUS at 08:03

## 2020-03-12 RX ADMIN — FAMOTIDINE 20 MG: 10 INJECTION INTRAVENOUS at 08:03

## 2020-03-12 NOTE — PROGRESS NOTES
[id number] - [place], [place] - [address] AT [facility] [address]  [address]  [place] [address]  Phone: [phone] Fax: [phone]    Call placed to [facility] to get copay cost to pt prior to discharge.  Spoke to Leo, [place] provided Rx and was advised after running through pts insurance that the Rx was rejected and is not covered by pts insurance will require a prior auth from insurance if approved then.  TN placed on hold to  get information to complete prior auth with insurance company.  While on hold message sent to ARIN HARRIS to inquire if medication can be changed to something that is typically less expensive and more likely to be covered by pts insurance. TN advised that the pts cultures came back as the organism he has is resistant to all of the abx that would normally be on the less expensive end and number of abx can prescribe are limited.    1206- TN attempted to meet with pt to discuss this information, only to find that the pt has refused wound care teaching and teach back and has discharged from building already.  TN to advise MD of this information.    1245- attempted to call pt on number listed on face sheet with no answer and  voice message left with TN contact information and requested for pt to contact TN as soon as possible.    1250- call placed to Cristobal pts relative listed no answer at this time

## 2020-03-12 NOTE — NURSING
Patient refused to do wound care.  Verbally gave instructions for wound care and gave wound care supplies to patient. Reviewed discharge instructions with patient.  Voiding w/o difficulty and ambulatory in hallways. VSS WNL for patient.  Patient walked out and refused transport.

## 2020-03-12 NOTE — NURSING
"Patient is refusing IV access and states, "doc is discharging me."  Unable to administer am meds.  Will verify d/c status w/ MD and will continue to monitor.   "

## 2020-03-12 NOTE — NURSING
Patient allowed me to gain IV access and administered am meds after reviewing pain med management w/ patient.

## 2020-03-12 NOTE — PLAN OF CARE
Patient remained free from injury throughout shift.  Patient removed his IV and refused to get another one physician notified.  Patient mediated twice throughout shift for hand discomfort.  Wound care provided to patient's left hand per physician's orders.  Call light within reach.          Problem: Adult Inpatient Plan of Care  Goal: Plan of Care Review  Outcome: Ongoing, Progressing  Flowsheets (Taken 3/12/2020 0519)  Plan of Care Reviewed With: patient  Goal: Patient-Specific Goal (Individualization)  Outcome: Ongoing, Progressing  Goal: Absence of Hospital-Acquired Illness or Injury  Outcome: Ongoing, Progressing  Goal: Optimal Comfort and Wellbeing  Outcome: Ongoing, Progressing  Goal: Readiness for Transition of Care  Outcome: Ongoing, Progressing  Goal: Rounds/Family Conference  Outcome: Ongoing, Progressing     Problem: Fall Injury Risk  Goal: Absence of Fall and Fall-Related Injury  Outcome: Ongoing, Progressing  Intervention: Promote Injury-Free Environment  Flowsheets (Taken 3/12/2020 0519)  Environmental Safety Modification: clutter free environment maintained; lighting adjusted

## 2020-03-12 NOTE — PLAN OF CARE
[facility]    HOME HEALTH ORDERS  FACE TO FACE ENCOUNTER    Patient Name: Max Mcgregor  YOB: 1970    PCP: Primary Doctor No   PCP Address: None  PCP Phone Number: None  PCP Fax: None    Encounter Date: 03/12/2020    Admit to Home Health    Diagnoses:  Active Hospital Problems    Diagnosis  POA    *Cellulitis and abscess of hand, except fingers and thumb [L03.119, L02.519]  Yes    Swelling of left hand [M79.89]  Unknown      Resolved Hospital Problems   No resolved problems to display.       No future appointments.  Follow-up Information     Asa Maciel MD On 3/27/2020.    Specialty:  Orthopedic Surgery  Why:  Appointment scheduled for March 27th at 10am  Contact information:  [address] [facility]  [facility] [address]  [phone]             [facility].    Why:  call at time of discharge to schedule follow up appointment and establish care for future medical needs  Contact information:  [address] [place] [address]  [phone]                     I have seen and examined this patient face to face today. My clinical findings that support the need for the home health skilled services and home bound status are the following:  Medical restrictions requiring assistance of another human to leave home due to  Wound care needs.    Allergies:Review of patient's allergies indicates:  No Known Allergies    Diet: regular diet    Activities: activity as tolerated    Nursing:   SN to complete comprehensive assessment including routine vital signs. Instruct on disease process and s/s of complications to report to MD. Follow specific home health arthoplasty protocol. Review/verify medication list sent home with the patient at time of discharge  and instruct patient/caregiver as needed. If coumadin ordered, coumadin clinic to manage INR with INR draws 2x per week with a goal to maintain INR between 1.8 and 2.2. Frequency may be adjusted depending on start of care  date.    Notify MD if SBP > 160 or < 90; DBP > 90 or < 50; HR > 120 or < 50; Temp > 101;     Home Medical Equipment:  Walker, 3-1 bedside commode, transfer tub bench    CONSULTS:    Physical Therapy to evaluate and treat. Evaluate for home safety and equipment needs; Establish/upgrade home exercise program. Perform / instruct on therapeutic exercises, gait training, transfer training, and Range of Motion.    OTHER:  none      WOUND CARE ORDERS  Follow Home Health specific protocol.    2-3 x per week dsg changes and local wound care      Medications: Review discharge medications with patient and family and provide education.      Current Discharge Medication List      START taking these medications    Details   cefpodoxime (VANTIN) 200 MG tablet Take 2 tablets (400 mg total) by mouth every 12 (twelve) hours. for 21 days  Qty: 84 tablet, Refills: 0      HYDROcodone-acetaminophen (NORCO) 5-325 mg per tablet Take 1 tablet by mouth every 6 (six) hours as needed for Pain.  Qty: 31 tablet, Refills: 0

## 2020-03-12 NOTE — PROGRESS NOTES
"[facility]  Infectious Disease  Progress Note    Patient Name: Max Mcgregor  MRN: [id number]  Admission Date: 3/9/2020  Length of Stay: 0 days  Attending Physician: Asa Maciel MD  Primary Care Provider: Primary Doctor No    Isolation Status: No active isolations  Assessment/Plan:      * Cellulitis and abscess of hand, except fingers and thumb  PT is a 49-year-old male who presented w/ 3-4 day history of swelling and pain left dorsal wrist.  Patient believes it was a spider bite because he is seen many spiders around his home but does not recall an actual bite.  I&D done by ER PA which produced copious amounts of purulent material. Viridans Strep.  Pt w/ hx of IVDU denies recent use but tox screen positive for opiates and cocaine.  Pt denies F or chills or additional symptoms.  ESR 78.  CRP 65.7    MRI-Incomplete examination.  There is diffuse edema about the dorsal aspect of the hand status post incision and drainage of presumed abscesses.      Pt taken to OR today for I and D by Ortho per notes "The infection did not appear to involve the tendons, the tendons were exposed through the retinaculum and did not have any period of material present."    Pt w/ hx of IVDU and non cooperation here not a good candidate for IV abx.    Plan  -Transition pt to Cefpodoxime 400 mg q12h for SSTI w/ Strep viridans  - Plan for 3 week treatment of PO abx w/ repeat labs-CBC, CMP, ESR and CRP and ID f/u to determine if additional abx needed  -ID f/u in 3 weeks  -ID will sign off      Pt discussed with primary team and staff.  Thank you for your consult. I will sign off. Please contact us if you have any additional questions.    Ramón Holley PA-C  Infectious Disease  [facility]    Subjective:     Principal Problem:Cellulitis and abscess of hand, except fingers and thumb    HPI: PT is a 49-year-old male who presented w/ 3-4 day history of swelling and pain left dorsal wrist.  " Patient believes it was a spider bite because he is seen many spiders around his home but does not recall an actual bite.  I&D done by ER PA which produced copious amounts of purulent material.  Cx pending.   Pt w/ hx of IVDU denies recent use but tox screen positive for opiates and cocaine.  Pt denies F or chills or additional symptoms.    MRI-Incomplete examination.  There is diffuse edema about the dorsal aspect of the hand status post incision and drainage of presumed abscesses.  There are scattered regions of high T2 signal within the capitate, lunate, and triquetrum.  This may reflect sequela of prior injury/degenerative change however developing osseous infection is not excluded.  An additional focus is noted within the dorsal aspect of the proximal 3rd metacarpal.  Correlation is advised, consider completing the full exam once patient is better able a tolerate for more definitive evaluation of the above.  Interval History: Pt afebrile w/o white count.  Strep viridans r to penicillin    Review of Systems   Constitutional: Negative for chills and fever.   Respiratory: Negative for cough and shortness of breath.    Cardiovascular: Negative for chest pain and leg swelling.   Gastrointestinal: Negative for abdominal distention, abdominal pain, constipation, diarrhea and nausea.   Genitourinary: Negative for difficulty urinating and dysuria.   Musculoskeletal: Negative for arthralgias, back pain, joint swelling and myalgias.   Skin: Positive for wound.     Objective:     Vital Signs (Most Recent):  Temp: 97.6 °F (36.4 °C) (03/12/20 1136)  Pulse: (!) 57 (03/12/20 1136)  Resp: 18 (03/12/20 1136)  BP: 128/78 (03/12/20 1136)  SpO2: 100 % (03/12/20 1136) Vital Signs (24h Range):  Temp:  [97.6 °F (36.4 °C)-98.5 °F (36.9 °C)] 97.6 °F (36.4 °C)  Pulse:  [47-57] 57  Resp:  [17-19] 18  SpO2:  [97 %-100 %] 100 %  BP: (128-149)/(67-86) 128/78     Weight: 77.1 kg (170 lb)  Body mass index is 26.63 kg/m².    Estimated  Creatinine Clearance: 91.8 mL/min (based on SCr of 0.9 mg/dL).    Physical Exam   Constitutional: He appears well-developed and well-nourished. No distress.   Cardiovascular: Normal rate, regular rhythm and normal heart sounds.   No murmur heard.  Pulmonary/Chest: No respiratory distress. He has no wheezes. He has no rales.   Abdominal: He exhibits no distension and no mass. There is no guarding.   Skin: He is not diaphoretic.   L wrist wrapped       Significant Labs: All pertinent labs within the past 24 hours have been reviewed.    Significant Imaging: I have reviewed all pertinent imaging results/findings within the past 24 hours.

## 2020-03-12 NOTE — PROGRESS NOTES
WRITTEN HEALTHCARE DISCHARGE INFORMATION      Things that YOU are RESPONSIBLE for to Manage Your Care At Home:     1. Getting your prescriptions filled.  2. Taking you medications as directed. DO NOT MISS ANY DOSES!  3. Going to your follow-up doctor appointments. This is important because it allows the doctor to monitor your progress and to determine if any changes need to be made to your treatment plan.     If you are unable to make your follow up appointments, please call the number listed and reschedule this appointment.      ____________HELP AT HOME____________________     Experiencing any SIGNS or SYMPTOMS: YOU CAN     Schedule a same day appopintment with your Primary Care Doctor or  you can call [facility] Line for 24/7 assistance at [phone]     If you are experience any signs or symptoms that have become severe, Call 911 and come to your nearest Emergency Room.     Thank you for choosing [facility] and allowing us to care for you.   From your care management team:      You should receive a call from [facility] within 48-72 hours to help manage your care after discharge. Please try to make sure that you answer your phone for this important phone call.    Follow-up Information     Asa Maciel MD On 3/27/2020.    Specialty:  Orthopedic Surgery  Why:  Appointment scheduled for March 27th at 10am  Contact information:  [address]  [facility] [address]  [phone]             [facility].    Why:  call at time of discharge to schedule follow up appointment and establish care for future medical needs  Contact information:  [address] [place] [address]  [phone]             [facility] On 3/16/2020.    Specialty:  Wound Care  Why:  Appointment for wound care scheduled for Monday March 16th at 3pm  Contact information:  [address]  [facility] [id number] [phone]

## 2020-03-12 NOTE — NURSING
22:33  Patient IV dislodged.  Patient educated on the reason an IV is needed.  Patient refused to another IV placed.  Dr. Ballesteros notified.  Physician stated have the Dr. Maciel notified in the morning.

## 2020-03-12 NOTE — SUBJECTIVE & OBJECTIVE
Interval History: Pt afebrile w/o white count.  Strep viridans r to penicillin    Review of Systems   Constitutional: Negative for chills and fever.   Respiratory: Negative for cough and shortness of breath.    Cardiovascular: Negative for chest pain and leg swelling.   Gastrointestinal: Negative for abdominal distention, abdominal pain, constipation, diarrhea and nausea.   Genitourinary: Negative for difficulty urinating and dysuria.   Musculoskeletal: Negative for arthralgias, back pain, joint swelling and myalgias.   Skin: Positive for wound.     Objective:     Vital Signs (Most Recent):  Temp: 97.6 °F (36.4 °C) (03/12/20 1136)  Pulse: (!) 57 (03/12/20 1136)  Resp: 18 (03/12/20 1136)  BP: 128/78 (03/12/20 1136)  SpO2: 100 % (03/12/20 1136) Vital Signs (24h Range):  Temp:  [97.6 °F (36.4 °C)-98.5 °F (36.9 °C)] 97.6 °F (36.4 °C)  Pulse:  [47-57] 57  Resp:  [17-19] 18  SpO2:  [97 %-100 %] 100 %  BP: (128-149)/(67-86) 128/78     Weight: 77.1 kg (170 lb)  Body mass index is 26.63 kg/m².    Estimated Creatinine Clearance: 91.8 mL/min (based on SCr of 0.9 mg/dL).    Physical Exam   Constitutional: He appears well-developed and well-nourished. No distress.   Cardiovascular: Normal rate, regular rhythm and normal heart sounds.   No murmur heard.  Pulmonary/Chest: No respiratory distress. He has no wheezes. He has no rales.   Abdominal: He exhibits no distension and no mass. There is no guarding.   Skin: He is not diaphoretic.   L wrist wrapped       Significant Labs: All pertinent labs within the past 24 hours have been reviewed.    Significant Imaging: I have reviewed all pertinent imaging results/findings within the past 24 hours.

## 2020-03-12 NOTE — DISCHARGE SUMMARY
Orthopaedic Discharge Summary  Max Mcgregor, 1970   [id number], [id number]      SUMMARY     Admit Date: 3/9/2020    Attending Physician: see cosigner    Discharge Date: 3/12/2020 11:55 AM ; also see date of last progress note from the Orthopaedic surgery department    Principal Diagnosis:wrist I&D  Secondary Diagnosis:     1. Cellulitis and abscess of hand, except fingers and thumb    2. Swelling of left hand    3. Sepsis             History of Present Illness: Max Mcgregor is a 49 y.o. male with a history significant for degenerative joint disease that has been refractory to all conservative/non-operative modalities.  An extensive discussion was had concerning the risks, benefits, alternatives, and indications for surgical intervention.  All questions were answered, and informed consent was obtained. No guarantees were made. The patient elected to proceed with surgery.      Hospital Course: On the aforementioned date, the patient underwent an I&D of his wrist, see epic notes for details.  He was cleared by ID for d/c with PO abx    Medications: See below for detailed medication reconciliation.    Patient Instructions:   Discharge Medication List as of 3/12/2020 11:54 AM      START taking these medications    Details   cefpodoxime (VANTIN) 200 MG tablet Take 2 tablets (400 mg total) by mouth every 12 (twelve) hours. for 21 days, Starting Thu 3/12/2020, Until Thu 4/2/2020, Print      HYDROcodone-acetaminophen (NORCO) 5-325 mg per tablet Take 1 tablet by mouth every 6 (six) hours as needed for Pain., Starting Thu 3/12/2020, Until Sun 3/22/2020, Print             Discharge Procedure Orders (must include Diet, Follow-up, Activity)   Discharge Procedure Orders (must include Diet, Follow-up, Activity)   Ambulatory referral/consult to Wound Clinic   Standing Status: Future   Referral Priority: Routine Referral Type: Consultation   Referral Reason: Specialty Services Required   Requested Specialty: Wound Care   Number of  Visits Requested: 1     Diet Adult Regular     Notify your health care provider if you experience any of the following:  increased confusion or weakness     Notify your health care provider if you experience any of the following:  persistent dizziness, light-headedness, or visual disturbances     Notify your health care provider if you experience any of the following:  worsening rash     Notify your health care provider if you experience any of the following:  severe persistent headache     Notify your health care provider if you experience any of the following:  difficulty breathing or increased cough     Notify your health care provider if you experience any of the following:  redness, tenderness, or signs of infection (pain, swelling, redness, odor or green/yellow discharge around incision site)     Notify your health care provider if you experience any of the following:  severe uncontrolled pain     Notify your health care provider if you experience any of the following:  persistent nausea and vomiting or diarrhea     Notify your health care provider if you experience any of the following:  temperature >100.4     Remove dressing in 24 hours            SARMAD Barakat.

## 2020-03-12 NOTE — PLAN OF CARE
Problem: Occupational Therapy Goal  Goal: Occupational Therapy Goal  Outcome: Met    Patient tolerated evaluation well, patient with no need for skilled OT services at this time. YANIV Garcia, MS

## 2020-03-12 NOTE — PROGRESS NOTES
Attempted to schedule follow up with ID per ortho recs unable to get an appointment within 2 weeks time frame due to pts insurance.  [place] [facility] [place] to request an appointment for 2 weeks and to call pt with appointment when available     113- referral for home health sent via [facility] to 30+ agencies for review.  TN to follow for response in [facility]

## 2020-03-12 NOTE — PROGRESS NOTES
Seen and examined at bedside this AM.  LINDY per nurse  Discussed with ID      PE:  aao fully   Wrist dsg c/d/i  2+ radial pulse        A/p s/p wrist I&D, discussed with ID  Will go home with PO abx  norco  Home health wound care  F/u with ID in 2 weeks and orthopedic surgery in 7-10 days

## 2020-03-12 NOTE — ASSESSMENT & PLAN NOTE
"PT is a 49-year-old male who presented w/ 3-4 day history of swelling and pain left dorsal wrist.  Patient believes it was a spider bite because he is seen many spiders around his home but does not recall an actual bite.  I&D done by ER PA which produced copious amounts of purulent material. Viridans Strep.  Pt w/ hx of IVDU denies recent use but tox screen positive for opiates and cocaine.  Pt denies F or chills or additional symptoms.  ESR 78.  CRP 65.7    MRI-Incomplete examination.  There is diffuse edema about the dorsal aspect of the hand status post incision and drainage of presumed abscesses.      Pt taken to OR today for I and D by Ortho per notes "The infection did not appear to involve the tendons, the tendons were exposed through the retinaculum and did not have any period of material present."    Pt w/ hx of IVDU and non cooperation here not a good candidate for IV abx.    Plan  -Transition pt to Cefpodoxime 400 mg q12h for SSTI w/ Strep viridans  - Plan for 3 week treatment of PO abx w/ repeat labs-CBC, CMP, ESR and CRP and ID f/u to determine if additional abx needed  -ID f/u in 3 weeks  -ID will sign off  "

## 2020-03-12 NOTE — PT/OT/SLP EVAL
"Occupational Therapy   Evaluation and Discharge Note    Name: Max Mcgregor  MRN: [id number]  Admitting Diagnosis:  Cellulitis and abscess of hand, except fingers and thumb 2 Days Post-Op    Recommendations:     Discharge Recommendations: home  Discharge Equipment Recommendations:  none  Barriers to discharge:       Assessment:     Max Mcgregor is a 49 y.o. male with a medical diagnosis of Cellulitis and abscess of hand, except fingers and thumb. At this time, patient is functioning at their prior level of function and does not require further acute OT services.     Plan:     During this hospitalization, patient does not require further acute OT services.  Please re-consult if situation changes.    · Plan of Care Reviewed with: patient    Subjective     Chief Complaint: "I'm ready to go"  Patient/Family Comments/goals: to go home    Occupational Profile:  Living Environment: lives with significant other, no concerns  Previous level of function: independent  Roles and Routines: was driving  Equipment Used at home:  none  Assistance upon Discharge: from signficant other    Pain/Comfort:  · Pain Rating 1: other (see comments)(Patient did not c/o pain)    Patients cultural, spiritual, Buddhism conflicts given the current situation: no    Objective:     Communicated with: nurse prior to session.  Patient found supine with peripheral IV upon OT entry to room.    General Precautions: Standard, fall   Orthopedic Precautions:N/A   Braces: N/A     Occupational Performance:    Cognitive/Visual Perceptual:  Cognitive/Psychosocial Skills:     -       Oriented to: Person, Place and Situation   -       Follows Commands/attention:Follows two-step commands  -       Communication: clear/fluent  -       Memory: No Deficits noted  -       Safety awareness/insight to disability: intact   -       Mood/Affect/Coping skills/emotional control: Appropriate to situation    AMPAC 6 Click ADL:  AMPAC Total Score: 24    Treatment & " Education:  Evaluation; Provided exercise handout to patient for tendon glides, wrist flexion/extension exercises.  Instructed patient to perform often to maintain flexibility in the wrist and digits.    Education:    Patient left supine with all lines intact and call button in reach    GOALS:   Multidisciplinary Problems     Occupational Therapy Goals     Not on file          Multidisciplinary Problems (Resolved)        Problem: Occupational Therapy Goal    Goal Priority Disciplines Outcome Interventions   Occupational Therapy Goal   (Resolved)     OT, PT/OT Met                    History:     Past Medical History:   Diagnosis Date    Cellulitis and abscess of hand 03/09/2020    Left dorsal side, I&D done 3/9/20    Cigarette smoker     History of drug use     cocaine & IV heroin       Past Surgical History:   Procedure Laterality Date    INCISION AND DRAINAGE OF HAND Left 3/10/2020    Procedure: INCISION AND DRAINAGE, WRIST;  Surgeon: Asa Maciel MD;  Location: [facility];  Service: Orthopedics;  Laterality: Left;    NO PAST SURGERIES  03/09/2020       Time Tracking:     OT Date of Treatment: 03/12/20  OT Start Time: 1133  OT Stop Time: 1144  OT Total Time (min): 11 min    Billable Minutes:Evaluation 11 minutes    YANIV Garcia MS  3/12/2020

## 2020-03-12 NOTE — NURSING
Patient took a shower w/o IV covered after being advised to wait for antibiotic to infuse.  Patient now has no IV access again.  Notifiying on call MD, Dr. Mccormick, to see of discharged today before gaining IV access again.

## 2020-03-14 LAB
BACTERIA BLD CULT: NORMAL
BACTERIA BLD CULT: NORMAL
BACTERIA SPEC AEROBE CULT: ABNORMAL
BACTERIA SPEC ANAEROBE CULT: NORMAL

## 2020-03-16 NOTE — ANESTHESIA POSTPROCEDURE EVALUATION
Anesthesia Post Evaluation    Patient: Max Mcgregor    Procedure(s) Performed: Procedure(s) (LRB):  INCISION AND DRAINAGE, WRIST (Left)    Final Anesthesia Type: general    Patient location during evaluation: PACU  Patient participation: Yes- Able to Participate  Level of consciousness: awake and alert  Post-procedure vital signs: reviewed and stable  Pain management: adequate  Airway patency: patent    PONV status at discharge: No PONV  Anesthetic complications: no      Cardiovascular status: blood pressure returned to baseline and hemodynamically stable  Respiratory status: unassisted and spontaneous ventilation  Hydration status: euvolemic  Follow-up not needed.          Vitals Value Taken Time   /78 3/12/2020 11:36 AM   Temp 36.4 °C (97.6 °F) 3/12/2020 11:36 AM   Pulse 57 3/12/2020 11:36 AM   Resp 18 3/12/2020 11:36 AM   SpO2 100 % 3/12/2020 11:36 AM         Event Time     Out of Recovery 03/10/2020 15:50:00          Pain/Ministerio Score: No data recorded

## 2020-04-13 LAB — FUNGUS SPEC CULT: NORMAL

## 2020-05-05 ENCOUNTER — TELEPHONE (OUTPATIENT)
Dept: INFECTIOUS DISEASES | Facility: CLINIC | Age: 50
End: 2020-05-05

## 2020-05-05 NOTE — TELEPHONE ENCOUNTER
Attempted to call patient regarding clinic appointment, however, number incorrect (an individual by another name answered).

## 2020-05-13 LAB
ACID FAST MOD KINY STN SPEC: NORMAL
MYCOBACTERIUM SPEC QL CULT: NORMAL

## 2023-06-19 ENCOUNTER — HOSPITAL ENCOUNTER (EMERGENCY)
Facility: HOSPITAL | Age: 53
Discharge: HOME | End: 2023-06-19
Attending: EMERGENCY MEDICINE
Payer: MEDICAID

## 2023-06-19 VITALS
HEIGHT: 67 IN | SYSTOLIC BLOOD PRESSURE: 153 MMHG | TEMPERATURE: 98 F | HEART RATE: 70 BPM | BODY MASS INDEX: 29.66 KG/M2 | WEIGHT: 189 LBS | DIASTOLIC BLOOD PRESSURE: 83 MMHG | OXYGEN SATURATION: 97 % | RESPIRATION RATE: 20 BRPM

## 2023-06-19 DIAGNOSIS — K08.89 PAIN, DENTAL: Primary | ICD-10-CM

## 2023-06-19 PROCEDURE — 25000003 PHARM REV CODE 250

## 2023-06-19 PROCEDURE — 96372 THER/PROPH/DIAG INJ SC/IM: CPT

## 2023-06-19 PROCEDURE — 99284 EMERGENCY DEPT VISIT MOD MDM: CPT

## 2023-06-19 PROCEDURE — 63600175 PHARM REV CODE 636 W HCPCS

## 2023-06-19 RX ORDER — IBUPROFEN 600 MG/1
600 TABLET ORAL
Status: DISCONTINUED | OUTPATIENT
Start: 2023-06-19 | End: 2023-06-19

## 2023-06-19 RX ORDER — KETOROLAC TROMETHAMINE 30 MG/ML
30 INJECTION, SOLUTION INTRAMUSCULAR; INTRAVENOUS
Status: COMPLETED | OUTPATIENT
Start: 2023-06-19 | End: 2023-06-19

## 2023-06-19 RX ORDER — AMOXICILLIN AND CLAVULANATE POTASSIUM 875; 125 MG/1; MG/1
1 TABLET, FILM COATED ORAL 2 TIMES DAILY
Qty: 28 TABLET | Refills: 0 | Status: SHIPPED | OUTPATIENT
Start: 2023-06-19 | End: 2023-07-03

## 2023-06-19 RX ORDER — ACETAMINOPHEN 500 MG
1000 TABLET ORAL
Status: COMPLETED | OUTPATIENT
Start: 2023-06-19 | End: 2023-06-19

## 2023-06-19 RX ORDER — AMOXICILLIN AND CLAVULANATE POTASSIUM 875; 125 MG/1; MG/1
1 TABLET, FILM COATED ORAL
Status: COMPLETED | OUTPATIENT
Start: 2023-06-19 | End: 2023-06-19

## 2023-06-19 RX ORDER — AMOXICILLIN AND CLAVULANATE POTASSIUM 875; 125 MG/1; MG/1
1 TABLET, FILM COATED ORAL 2 TIMES DAILY
Qty: 14 TABLET | Refills: 0 | Status: SHIPPED | OUTPATIENT
Start: 2023-06-19 | End: 2023-06-19 | Stop reason: SDUPTHER

## 2023-06-19 RX ADMIN — KETOROLAC TROMETHAMINE 30 MG: 30 INJECTION, SOLUTION INTRAMUSCULAR; INTRAVENOUS at 01:06

## 2023-06-19 RX ADMIN — AMOXICILLIN AND CLAVULANATE POTASSIUM 1 TABLET: 875; 125 TABLET, FILM COATED ORAL at 01:06

## 2023-06-19 RX ADMIN — ACETAMINOPHEN 1000 MG: 500 TABLET ORAL at 01:06

## 2023-06-19 NOTE — ED PROVIDER NOTES
"Encounter Date: 6/19/2023       History     Chief Complaint   Patient presents with    Dental Problem     Pt reports pain and swelling to the left upper mouth due to a broken molar. Pt has not seen a dentist yet.      Max Mcgregor is a 53 y.o. male with PMH of substance use disorder, presenting to [facility] for dental pain.  Patient reports that he has not seen a dentist in 2 years.  He has left upper dental pain and facial swelling that has been present for the last day.  Patient has taken Tylenol for pain      Review of patient's allergies indicates:  No Known Allergies  Past Medical History:   Diagnosis Date    Cellulitis and abscess of hand 03/09/2020    Left dorsal side, I&D done 3/9/20    Cigarette smoker     History of drug use     cocaine & IV heroin     Past Surgical History:   Procedure Laterality Date    INCISION AND DRAINAGE OF HAND Left 3/10/2020    Procedure: INCISION AND DRAINAGE, WRIST;  Surgeon: Asa Maciel MD;  Location: [facility];  Service: Orthopedics;  Laterality: Left;    NO PAST SURGERIES  03/09/2020     No family history on file.  Social History     Tobacco Use    Smoking status: Every Day     Packs/day: 0.50     Types: Cigarettes    Smokeless tobacco: Never   Substance Use Topics    Alcohol use: Yes     Comment: 6 pack of beer per week    Drug use: Not Currently     Types: Heroin, "Crack" cocaine     Comment: 3/9/20:  IV heroin      Review of Systems   Constitutional:  Negative for chills and fever.   HENT:  Positive for dental problem and facial swelling. Negative for congestion, rhinorrhea and sore throat.    Eyes:  Negative for visual disturbance.   Respiratory:  Negative for shortness of breath.    Cardiovascular:  Negative for chest pain and leg swelling.   Gastrointestinal:  Negative for abdominal distention, abdominal pain, constipation, diarrhea, nausea and vomiting.   Endocrine: Negative for polyuria.   Genitourinary:  Negative for decreased urine volume.   Skin:  Negative for " pallor.   Neurological:  Negative for facial asymmetry and headaches.   Psychiatric/Behavioral:  The patient is not nervous/anxious.      Physical Exam     Initial Vitals [06/19/23 1223]   BP Pulse Resp Temp SpO2   (!) 161/91 64 16 98 °F (36.7 °C) 97 %      MAP       --         Physical Exam    Nursing note and vitals reviewed.  Constitutional: He is cooperative.  Non-toxic appearance.   HENT:   Head: Normocephalic and atraumatic.   Mouth/Throat: Abnormal dentition. Dental caries present. No dental abscesses.       Eyes: Conjunctivae and EOM are normal. Pupils are equal, round, and reactive to light.   Neck: Trachea normal and phonation normal. Neck supple. No tracheal deviation present.   Cardiovascular:  Normal rate, regular rhythm, normal heart sounds, intact distal pulses and normal pulses.     Exam reveals no gallop, no S3, no S4 and no friction rub.       No murmur heard.  Pulmonary/Chest: Breath sounds normal. No respiratory distress. He has no wheezes. He has no rhonchi. He has no rales.   Abdominal: Abdomen is soft. He exhibits no distension. There is no abdominal tenderness.   Musculoskeletal:      Cervical back: Neck supple.      Comments: Extremities atraumatic x4.  Normal gait.  No clubbing, cyanosis, edema.     Neurological: He is alert. No sensory deficit. GCS eye subscore is 4. GCS verbal subscore is 5. GCS motor subscore is 6.   Skin: Skin is warm, dry and intact. Capillary refill takes less than 2 seconds.   Psychiatric: His speech is normal.       ED Course   Procedures  Labs Reviewed - No data to display       Imaging Results    None          Medications   acetaminophen tablet 1,000 mg (1,000 mg Oral Given 6/19/23 1325)   amoxicillin-clavulanate 875-125mg per tablet 1 tablet (1 tablet Oral Given 6/19/23 1325)   ketorolac injection 30 mg (30 mg Intramuscular Given 6/19/23 1326)     Medical Decision Making:   Initial Assessment:   53-year-old male with history of substance use disorder presenting  for dental pain.  Initially, patient is hypertensive but overall hemodynamically stable.  Differential Diagnosis:   Dental abscess, gingivitis, dental fracture  ED Management:  Patient presents with symptoms consistent with dental abscess due to significant dental disease, pain of the left upper canine, associated facial swelling.  Low suspicion for systemic disease since patient has been afebrile no other infectious symptoms.  Patient has concomitant gingivitis and multiple dental caries/dental fractures.    Treatments in the emergency department include patient given initial dose of Augmentin.  Additionally, given Tylenol and Toradol for pain control.  Provided patient with information for dental resources.  Additionally, provided patient with resources to arrange medical care.  Provided prescription for 14 days of Augmentin.  Advised patient to obtain a dental appointment today due to risk of progression of dental abscess.  Patient is hemodynamically stable with no acute intervention indicated emergency department, he is appropriate for discharge at this time.           ED Course as of 06/19/23 1348   Mon Jun 19, 2023   1324 Pt with left upper canine pain and swelling.  +large caries and gingivitis.  [MH]      ED Course User Index  [MH] Monserrat Cruz MD                 Clinical Impression:   Final diagnoses:  [K08.89] Pain, dental (Primary)        ED Disposition Condition    Discharge Stable          ED Prescriptions       Medication Sig Dispense Start Date End Date Auth. Provider    amoxicillin-clavulanate 875-125mg (AUGMENTIN) 875-125 mg per tablet  (Status: Discontinued) Take 1 tablet by mouth 2 (two) times daily. 14 tablet 6/19/2023 6/19/2023 Keerthi Sanchez MD    amoxicillin-clavulanate 875-125mg (AUGMENTIN) 875-125 mg per tablet Take 1 tablet by mouth 2 (two) times daily. for 14 days 28 tablet 6/19/2023 7/3/2023 Keerthi Sanchez MD          Follow-up Information       Follow up With Specialties  Details Why Contact Info    [address]    Call the following number to help you navigate the healthcare system and find a provider [phone]             Keerthi Sanchez MD  Resident  06/19/23 1235    I have reviewed and concur with the resident's history, physical, assessment, and plan.  I have personally interviewed and examined the patient at bedside.  See below addendum for my evaluation and additional findings.  ED Course as of 06/19/23 1929 Mon Jun 19, 2023   1324 Pt with left upper canine pain and swelling.  +large caries and gingivitis.  [MH]      ED Course User Index  [MH] Micelle STEFANIA Cruz MD            Micelle STEFANIA Cruz MD  06/19/23 1929

## 2023-06-19 NOTE — ED TRIAGE NOTES
Pt arrived to ED for c/o of upper left oral swelling x1 day. Has broken molar and hasn't seen dentist yet

## 2023-06-19 NOTE — DISCHARGE INSTRUCTIONS
Diagnosis:  Dental pain    Please take Augmentin 2 times a day for the next 14 days.      **Please use dental resource sheet to obtain dental evaluation today.**    Take ibuprofen (also called Advil, Motrin) for your pain. This medicine is available over-the-counter in 200 mg tablets.  - You may take 600 mg every 6 hours, or 800 mg every 8 hours as needed   - Do not take more than this amount, as it can cause kidney problems, bleeding in your stomach, and other serious problems.   - Do not also take naproxen (Aleve) at the same time or on the same day  - If you have heart problems or uncontrolled high blood pressure, you should not take ibuprofen for more than 3 days without discussing with your doctor    If your pain is not controlled with ibuprofen, you may also take acetaminophen (also called Tylenol).  - You may take up to 1,000 mg of Tylenol every 6 hours as needed  - Do not take more than 4,000 mg in 24 hours (1 day) as this may cause liver damage  - Many other medicines include acetaminophen (Tylenol) such as: Norco, Vicodin, Tylenol #3, many cold medicines, etc.  - Please read all labels carefully and do not combine medicines that include acetaminophen.  - If you have a history of liver disease or drink alcohol heavily, do not take acetaminophen (Tylenol) since it can damage your liver    Follow-Up Plan:  - Follow-up with primary care doctor within 3 - 5 days  - Additional testing and/or evaluation as directed by your primary doctor    Return to the Emergency Department for symptoms including but not limited to: worsening symptoms, shortness of breath or chest pain, vomiting with inability to hold down fluids, fevers greater than 100.4°F, passing out/fainting/unconsciousness, or other concerning symptoms.

## 2024-09-09 ENCOUNTER — HOSPITAL ENCOUNTER (INPATIENT)
Facility: HOSPITAL | Age: 54
LOS: 4 days | Discharge: HOME | DRG: 603 | End: 2024-09-13
Attending: EMERGENCY MEDICINE | Admitting: HOSPITALIST
Payer: MEDICAID

## 2024-09-09 DIAGNOSIS — M65.9 TENOSYNOVITIS OF FINGER: ICD-10-CM

## 2024-09-09 DIAGNOSIS — R07.9 CHEST PAIN: ICD-10-CM

## 2024-09-09 DIAGNOSIS — M86.9 OSTEOMYELITIS OF FINGER OF RIGHT HAND: Primary | ICD-10-CM

## 2024-09-09 LAB
ALBUMIN SERPL BCP-MCNC: 3.2 G/DL (ref 3.5–5.2)
ALP SERPL-CCNC: 80 U/L (ref 55–135)
ALT SERPL W/O P-5'-P-CCNC: 8 U/L (ref 10–44)
ANION GAP SERPL CALC-SCNC: 8 MMOL/L (ref 8–16)
AST SERPL-CCNC: 17 U/L (ref 10–40)
BASOPHILS # BLD AUTO: 0.03 K/UL (ref 0–0.2)
BASOPHILS NFR BLD: 0.3 % (ref 0–1.9)
BILIRUB SERPL-MCNC: 0.2 MG/DL (ref 0.1–1)
BUN SERPL-MCNC: 9 MG/DL (ref 6–20)
CALCIUM SERPL-MCNC: 9.2 MG/DL (ref 8.7–10.5)
CHLORIDE SERPL-SCNC: 109 MMOL/L (ref 95–110)
CO2 SERPL-SCNC: 22 MMOL/L (ref 23–29)
CREAT SERPL-MCNC: 0.9 MG/DL (ref 0.5–1.4)
CRP SERPL-MCNC: 36.8 MG/L (ref 0–8.2)
DIFFERENTIAL METHOD BLD: ABNORMAL
EOSINOPHIL # BLD AUTO: 0.1 K/UL (ref 0–0.5)
EOSINOPHIL NFR BLD: 0.9 % (ref 0–8)
ERYTHROCYTE [DISTWIDTH] IN BLOOD BY AUTOMATED COUNT: 14.2 % (ref 11.5–14.5)
ERYTHROCYTE [SEDIMENTATION RATE] IN BLOOD BY PHOTOMETRIC METHOD: 56 MM/HR (ref 0–23)
EST. GFR  (NO RACE VARIABLE): >60 ML/MIN/1.73 M^2
GLUCOSE SERPL-MCNC: 88 MG/DL (ref 70–110)
HCT VFR BLD AUTO: 39.1 % (ref 40–54)
HCV AB SERPL QL IA: REACTIVE
HGB BLD-MCNC: 12.7 G/DL (ref 14–18)
HIV 1+2 AB+HIV1 P24 AG SERPL QL IA: NORMAL
IMM GRANULOCYTES # BLD AUTO: 0.04 K/UL (ref 0–0.04)
IMM GRANULOCYTES NFR BLD AUTO: 0.5 % (ref 0–0.5)
LYMPHOCYTES # BLD AUTO: 2.5 K/UL (ref 1–4.8)
LYMPHOCYTES NFR BLD: 28.8 % (ref 18–48)
MCH RBC QN AUTO: 27.1 PG (ref 27–31)
MCHC RBC AUTO-ENTMCNC: 32.5 G/DL (ref 32–36)
MCV RBC AUTO: 83 FL (ref 82–98)
MONOCYTES # BLD AUTO: 0.7 K/UL (ref 0.3–1)
MONOCYTES NFR BLD: 8.3 % (ref 4–15)
NEUTROPHILS # BLD AUTO: 5.4 K/UL (ref 1.8–7.7)
NEUTROPHILS NFR BLD: 61.2 % (ref 38–73)
NRBC BLD-RTO: 0 /100 WBC
PLATELET # BLD AUTO: 285 K/UL (ref 150–450)
PLATELET BLD QL SMEAR: ABNORMAL
PMV BLD AUTO: 10.9 FL (ref 9.2–12.9)
POTASSIUM SERPL-SCNC: 4.7 MMOL/L (ref 3.5–5.1)
PROT SERPL-MCNC: 7.3 G/DL (ref 6–8.4)
RBC # BLD AUTO: 4.69 M/UL (ref 4.6–6.2)
SODIUM SERPL-SCNC: 139 MMOL/L (ref 136–145)
WBC # BLD AUTO: 8.76 K/UL (ref 3.9–12.7)

## 2024-09-09 PROCEDURE — 80053 COMPREHEN METABOLIC PANEL: CPT | Performed by: EMERGENCY MEDICINE

## 2024-09-09 PROCEDURE — 96375 TX/PRO/DX INJ NEW DRUG ADDON: CPT

## 2024-09-09 PROCEDURE — 86140 C-REACTIVE PROTEIN: CPT | Performed by: EMERGENCY MEDICINE

## 2024-09-09 PROCEDURE — G0378 HOSPITAL OBSERVATION PER HR: HCPCS

## 2024-09-09 PROCEDURE — 63600175 PHARM REV CODE 636 W HCPCS: Performed by: EMERGENCY MEDICINE

## 2024-09-09 PROCEDURE — 85025 COMPLETE CBC W/AUTO DIFF WBC: CPT | Performed by: EMERGENCY MEDICINE

## 2024-09-09 PROCEDURE — 87522 HEPATITIS C REVRS TRNSCRPJ: CPT | Performed by: EMERGENCY MEDICINE

## 2024-09-09 PROCEDURE — 25000003 PHARM REV CODE 250: Performed by: EMERGENCY MEDICINE

## 2024-09-09 PROCEDURE — 63600175 PHARM REV CODE 636 W HCPCS: Performed by: FAMILY MEDICINE

## 2024-09-09 PROCEDURE — 86803 HEPATITIS C AB TEST: CPT | Performed by: EMERGENCY MEDICINE

## 2024-09-09 PROCEDURE — 99285 EMERGENCY DEPT VISIT HI MDM: CPT | Mod: 25

## 2024-09-09 PROCEDURE — 96365 THER/PROPH/DIAG IV INF INIT: CPT

## 2024-09-09 PROCEDURE — 96367 TX/PROPH/DG ADDL SEQ IV INF: CPT

## 2024-09-09 PROCEDURE — 96366 THER/PROPH/DIAG IV INF ADDON: CPT

## 2024-09-09 PROCEDURE — 87040 BLOOD CULTURE FOR BACTERIA: CPT | Mod: 59 | Performed by: EMERGENCY MEDICINE

## 2024-09-09 PROCEDURE — 85652 RBC SED RATE AUTOMATED: CPT | Performed by: EMERGENCY MEDICINE

## 2024-09-09 PROCEDURE — 96372 THER/PROPH/DIAG INJ SC/IM: CPT | Performed by: FAMILY MEDICINE

## 2024-09-09 PROCEDURE — 11000001 HC ACUTE MED/SURG PRIVATE ROOM

## 2024-09-09 PROCEDURE — 25000003 PHARM REV CODE 250: Performed by: FAMILY MEDICINE

## 2024-09-09 PROCEDURE — 87389 HIV-1 AG W/HIV-1&-2 AB AG IA: CPT | Performed by: EMERGENCY MEDICINE

## 2024-09-09 RX ORDER — KETOROLAC TROMETHAMINE 30 MG/ML
10 INJECTION, SOLUTION INTRAMUSCULAR; INTRAVENOUS
Status: COMPLETED | OUTPATIENT
Start: 2024-09-09 | End: 2024-09-09

## 2024-09-09 RX ORDER — TALC
6 POWDER (GRAM) TOPICAL NIGHTLY PRN
Status: DISCONTINUED | OUTPATIENT
Start: 2024-09-09 | End: 2024-09-13 | Stop reason: HOSPADM

## 2024-09-09 RX ORDER — OXYCODONE HYDROCHLORIDE 5 MG/1
5 TABLET ORAL EVERY 6 HOURS PRN
Status: DISCONTINUED | OUTPATIENT
Start: 2024-09-09 | End: 2024-09-10

## 2024-09-09 RX ORDER — DROPERIDOL 2.5 MG/ML
1.25 INJECTION, SOLUTION INTRAMUSCULAR; INTRAVENOUS
Status: COMPLETED | OUTPATIENT
Start: 2024-09-09 | End: 2024-09-09

## 2024-09-09 RX ORDER — IBUPROFEN 200 MG
24 TABLET ORAL
Status: DISCONTINUED | OUTPATIENT
Start: 2024-09-09 | End: 2024-09-13 | Stop reason: HOSPADM

## 2024-09-09 RX ORDER — HEPARIN SODIUM 5000 [USP'U]/ML
5000 INJECTION, SOLUTION INTRAVENOUS; SUBCUTANEOUS EVERY 8 HOURS
Status: DISCONTINUED | OUTPATIENT
Start: 2024-09-09 | End: 2024-09-13 | Stop reason: HOSPADM

## 2024-09-09 RX ORDER — NALOXONE HCL 0.4 MG/ML
0.02 VIAL (ML) INJECTION
Status: DISCONTINUED | OUTPATIENT
Start: 2024-09-09 | End: 2024-09-13 | Stop reason: HOSPADM

## 2024-09-09 RX ORDER — MORPHINE SULFATE 4 MG/ML
4 INJECTION, SOLUTION INTRAMUSCULAR; INTRAVENOUS ONCE
Status: COMPLETED | OUTPATIENT
Start: 2024-09-09 | End: 2024-09-09

## 2024-09-09 RX ORDER — HYDROMORPHONE HYDROCHLORIDE 1 MG/ML
0.5 INJECTION, SOLUTION INTRAMUSCULAR; INTRAVENOUS; SUBCUTANEOUS
Status: COMPLETED | OUTPATIENT
Start: 2024-09-09 | End: 2024-09-09

## 2024-09-09 RX ORDER — MORPHINE SULFATE 4 MG/ML
4 INJECTION, SOLUTION INTRAMUSCULAR; INTRAVENOUS
Status: COMPLETED | OUTPATIENT
Start: 2024-09-09 | End: 2024-09-09

## 2024-09-09 RX ORDER — SODIUM CHLORIDE 0.9 % (FLUSH) 0.9 %
10 SYRINGE (ML) INJECTION EVERY 12 HOURS PRN
Status: DISCONTINUED | OUTPATIENT
Start: 2024-09-09 | End: 2024-09-13 | Stop reason: HOSPADM

## 2024-09-09 RX ORDER — ALUMINUM HYDROXIDE, MAGNESIUM HYDROXIDE, AND SIMETHICONE 1200; 120; 1200 MG/30ML; MG/30ML; MG/30ML
30 SUSPENSION ORAL 4 TIMES DAILY PRN
Status: DISCONTINUED | OUTPATIENT
Start: 2024-09-09 | End: 2024-09-13 | Stop reason: HOSPADM

## 2024-09-09 RX ORDER — ACETAMINOPHEN 500 MG
1000 TABLET ORAL EVERY 8 HOURS PRN
Status: DISCONTINUED | OUTPATIENT
Start: 2024-09-09 | End: 2024-09-12

## 2024-09-09 RX ORDER — ALBUTEROL SULFATE 90 UG/1
2 INHALANT RESPIRATORY (INHALATION) EVERY 6 HOURS PRN
Status: DISCONTINUED | OUTPATIENT
Start: 2024-09-09 | End: 2024-09-13 | Stop reason: HOSPADM

## 2024-09-09 RX ORDER — ONDANSETRON HYDROCHLORIDE 2 MG/ML
4 INJECTION, SOLUTION INTRAVENOUS EVERY 8 HOURS PRN
Status: DISCONTINUED | OUTPATIENT
Start: 2024-09-09 | End: 2024-09-13 | Stop reason: HOSPADM

## 2024-09-09 RX ORDER — GLUCAGON 1 MG
1 KIT INJECTION
Status: DISCONTINUED | OUTPATIENT
Start: 2024-09-09 | End: 2024-09-13 | Stop reason: HOSPADM

## 2024-09-09 RX ORDER — IBUPROFEN 200 MG
16 TABLET ORAL
Status: DISCONTINUED | OUTPATIENT
Start: 2024-09-09 | End: 2024-09-13 | Stop reason: HOSPADM

## 2024-09-09 RX ADMIN — DROPERIDOL 1.25 MG: 2.5 INJECTION, SOLUTION INTRAMUSCULAR; INTRAVENOUS at 05:09

## 2024-09-09 RX ADMIN — KETOROLAC TROMETHAMINE 10 MG: 30 INJECTION, SOLUTION INTRAMUSCULAR; INTRAVENOUS at 02:09

## 2024-09-09 RX ADMIN — MORPHINE SULFATE 4 MG: 4 INJECTION INTRAVENOUS at 09:09

## 2024-09-09 RX ADMIN — ACETAMINOPHEN 1000 MG: 500 TABLET ORAL at 11:09

## 2024-09-09 RX ADMIN — HYDROMORPHONE HYDROCHLORIDE 0.5 MG: 1 INJECTION, SOLUTION INTRAMUSCULAR; INTRAVENOUS; SUBCUTANEOUS at 05:09

## 2024-09-09 RX ADMIN — DEXTROSE MONOHYDRATE 2 G: 5 INJECTION INTRAVENOUS at 01:09

## 2024-09-09 RX ADMIN — VANCOMYCIN HYDROCHLORIDE 1750 MG: 500 INJECTION, POWDER, LYOPHILIZED, FOR SOLUTION INTRAVENOUS at 02:09

## 2024-09-09 RX ADMIN — HEPARIN SODIUM 5000 UNITS: 5000 INJECTION INTRAVENOUS; SUBCUTANEOUS at 09:09

## 2024-09-09 RX ADMIN — OXYCODONE HYDROCHLORIDE 5 MG: 5 TABLET ORAL at 07:09

## 2024-09-09 RX ADMIN — MORPHINE SULFATE 4 MG: 4 INJECTION INTRAVENOUS at 01:09

## 2024-09-09 RX ADMIN — PIPERACILLIN SODIUM AND TAZOBACTAM SODIUM 4.5 G: 4; .5 INJECTION, POWDER, FOR SOLUTION INTRAVENOUS at 07:09

## 2024-09-09 RX ADMIN — HYDROMORPHONE HYDROCHLORIDE 0.5 MG: 1 INJECTION, SOLUTION INTRAMUSCULAR; INTRAVENOUS; SUBCUTANEOUS at 02:09

## 2024-09-09 NOTE — ED PROVIDER NOTES
"Encounter Date: 9/9/2024       History     Chief Complaint   Patient presents with    Finger Pain     Formal voluntary from [place]. Swelling to R middle finger x2 weeks     54-year-old male with a history of IV drug use presents with pain to the right 3rd finger.  He is currently on rehab.  Symptoms started over the weekend.  Worse today.  He denies a history of injecting into the right arm.  He says he usually injects into the left.  He has a remote history of injury to the right hand from work.  Patient denies nausea, vomiting, diarrhea, fever, cough, shortness of breath, chest pain, abdominal pain, or dysuria.  The patients available PMH, PSH, Social History, medications, allergies, and triage vital signs were reviewed just prior to their medical evaluation.         Review of patient's allergies indicates:  No Known Allergies  Past Medical History:   Diagnosis Date    Cellulitis and abscess of hand 03/09/2020    Left dorsal side, I&D done 3/9/20    Cigarette smoker     History of drug use     cocaine & IV heroin     Past Surgical History:   Procedure Laterality Date    INCISION AND DRAINAGE OF HAND Left 3/10/2020    Procedure: INCISION AND DRAINAGE, WRIST;  Surgeon: Asa Maciel MD;  Location: [facility];  Service: Orthopedics;  Laterality: Left;    NO PAST SURGERIES  03/09/2020     No family history on file.  Social History     Tobacco Use    Smoking status: Every Day     Current packs/day: 0.50     Types: Cigarettes    Smokeless tobacco: Never   Substance Use Topics    Alcohol use: Yes     Comment: 6 pack of beer per week    Drug use: Not Currently     Types: Heroin, "Crack" cocaine     Comment: 3/9/20:  IV heroin      Review of Systems   Constitutional:  Negative for fever.   Respiratory:  Negative for cough and shortness of breath.    Cardiovascular:  Negative for chest pain.   Gastrointestinal:  Negative for abdominal pain, diarrhea, nausea and vomiting.   Genitourinary:  Negative for dysuria. "   Musculoskeletal:  Positive for arthralgias, joint swelling and myalgias.       Physical Exam     Initial Vitals [09/09/24 1135]   BP Pulse Resp Temp SpO2   130/86 90 15 98.3 °F (36.8 °C) 100 %      MAP       --         Physical Exam    Nursing note and vitals reviewed.  Constitutional: He appears well-developed and well-nourished. He is not diaphoretic. No distress.   HENT:   Head: Normocephalic and atraumatic.   Nose: Nose normal.   Eyes: Conjunctivae are normal. Right eye exhibits no discharge. Left eye exhibits no discharge.   Neck: Neck supple.   Normal range of motion.  Cardiovascular:  Normal rate, regular rhythm, normal heart sounds and intact distal pulses.     Exam reveals no gallop and no friction rub.       No murmur heard.  Pulmonary/Chest: Breath sounds normal. No respiratory distress. He has no wheezes. He has no rhonchi. He has no rales.   Abdominal: He exhibits no distension.   Musculoskeletal:         General: Tenderness and edema present.      Cervical back: Normal range of motion and neck supple.      Comments: Right 3rd finger with edema, felon and likely flexor tenosynovitis     Neurological: He is alert and oriented to person, place, and time. He has normal strength. GCS score is 15. GCS eye subscore is 4. GCS verbal subscore is 5. GCS motor subscore is 6.   Skin: Skin is warm and dry. No rash noted. No erythema.   Psychiatric: He has a normal mood and affect. His behavior is normal. Judgment and thought content normal.         ED Course   Procedures  Labs Reviewed   HEPATITIS C ANTIBODY - Abnormal       Result Value    Hepatitis C Ab Reactive (*)     Narrative:     Release to patient->Immediate   CBC W/ AUTO DIFFERENTIAL - Abnormal    WBC 8.76      RBC 4.69      Hemoglobin 12.7 (*)     Hematocrit 39.1 (*)     MCV 83      MCH 27.1      MCHC 32.5      RDW 14.2      Platelets 285      MPV 10.9      Immature Granulocytes 0.5      Gran # (ANC) 5.4      Immature Grans (Abs) 0.04      Lymph # 2.5       Mono # 0.7      Eos # 0.1      Baso # 0.03      nRBC 0      Gran % 61.2      Lymph % 28.8      Mono % 8.3      Eosinophil % 0.9      Basophil % 0.3      Platelet Estimate Appears normal      Differential Method Automated     COMPREHENSIVE METABOLIC PANEL - Abnormal    Sodium 139      Potassium 4.7      Chloride 109      CO2 22 (*)     Glucose 88      BUN 9      Creatinine 0.9      Calcium 9.2      Total Protein 7.3      Albumin 3.2 (*)     Total Bilirubin 0.2      Alkaline Phosphatase 80      AST 17      ALT 8 (*)     eGFR >60.0      Anion Gap 8     SEDIMENTATION RATE - Abnormal    Sed Rate 56 (*)    C-REACTIVE PROTEIN - Abnormal    CRP 36.8 (*)    CULTURE, BLOOD   CULTURE, BLOOD   HIV 1 / 2 ANTIBODY    HIV 1/2 Ag/Ab Non-reactive      Narrative:     Release to patient->Immediate   HEPATITIS C RNA, QUANTITATIVE, PCR          Imaging Results              X-Ray Hand 3 view Right (Final result)  Result time 09/09/24 14:55:22      Final result by Abhi Coleman MD (09/09/24 14:55:22)                   Impression:      As above.      Electronically signed by: Abhi Coleman  Date:    09/09/2024  Time:    14:55               Narrative:    EXAMINATION:  XR HAND COMPLETE 3 VIEW RIGHT    CLINICAL HISTORY:  right finger injury;    TECHNIQUE:  PA, lateral, and oblique views of the right hand were performed.    COMPARISON:  None    FINDINGS:  Resection change at the 5th digit at the proximal metacarpal.  Partial absence or resorption at the 4th finger distal tuft.  No acute fracture, dislocation, or osseous destruction.  No radiopaque foreign body.                                       Medications   morphine injection 4 mg (4 mg Intravenous Given 9/9/24 1340)   vancomycin 1.75 g in 5 % dextrose 500 mL IVPB (0 mg Intravenous Stopped 9/9/24 1645)   cefTRIAXone (ROCEPHIN) 2 g in D5W 100 mL IVPB (MB+) (0 g Intravenous Stopped 9/9/24 1420)   ketorolac injection 9.999 mg (9.999 mg Intravenous Given 9/9/24 1405)    HYDROmorphone injection 0.5 mg (0.5 mg Intravenous Given 9/9/24 1405)   droPERidol injection 1.25 mg (1.25 mg Intravenous Given 9/9/24 1721)   HYDROmorphone injection 0.5 mg (0.5 mg Intravenous Given 9/9/24 1720)     Medical Decision Making  54-year-old male presents with infection to the right 3rd finger.  Vitals normal.  Physical exam as above.  X-ray unremarkable.  X-rays with elevated inflammatory markers.  Started on IV antibiotics for presumed tenosynovitis.  No fluid collection seen on bedside ultrasound.  Doubt felon.  Will admit to hospital medicine.  Orthopedics to follow along.  Did bedside teaching.  All questions answered.  Patient acknowledges understanding.     Amount and/or Complexity of Data Reviewed  Labs: ordered. Decision-making details documented in ED Course.  Radiology: ordered. Decision-making details documented in ED Course.    Risk  Prescription drug management.  Decision regarding hospitalization.  Diagnosis or treatment significantly limited by social determinants of health.                                      Clinical Impression:  Final diagnoses:  [M65.9] Tenosynovitis of finger          ED Disposition Condition    Observation                 Abhi Cintron MD  09/09/24 6441

## 2024-09-09 NOTE — ED TRIAGE NOTES
Pt. Is a 54 yr old -American male presenting to the ED with pain and swelling in the right hand.  Currently a voluntary Pt. From [facility] for opoid detox.

## 2024-09-10 PROBLEM — R76.8 HEPATITIS C ANTIBODY TEST POSITIVE: Status: ACTIVE | Noted: 2024-09-10

## 2024-09-10 PROBLEM — L03.113 CELLULITIS OF RIGHT HAND: Status: ACTIVE | Noted: 2024-09-10

## 2024-09-10 PROBLEM — M79.644 PAIN OF RIGHT MIDDLE FINGER: Status: ACTIVE | Noted: 2024-09-10

## 2024-09-10 LAB
ALBUMIN SERPL BCP-MCNC: 3 G/DL (ref 3.5–5.2)
ALP SERPL-CCNC: 81 U/L (ref 55–135)
ALT SERPL W/O P-5'-P-CCNC: 6 U/L (ref 10–44)
ANION GAP SERPL CALC-SCNC: 9 MMOL/L (ref 8–16)
AST SERPL-CCNC: 11 U/L (ref 10–40)
BASOPHILS # BLD AUTO: 0.02 K/UL (ref 0–0.2)
BASOPHILS NFR BLD: 0.2 % (ref 0–1.9)
BILIRUB SERPL-MCNC: 0.2 MG/DL (ref 0.1–1)
BUN SERPL-MCNC: 13 MG/DL (ref 6–20)
CALCIUM SERPL-MCNC: 9.6 MG/DL (ref 8.7–10.5)
CHLORIDE SERPL-SCNC: 104 MMOL/L (ref 95–110)
CO2 SERPL-SCNC: 22 MMOL/L (ref 23–29)
CREAT SERPL-MCNC: 1.1 MG/DL (ref 0.5–1.4)
DIFFERENTIAL METHOD BLD: ABNORMAL
EOSINOPHIL # BLD AUTO: 0.1 K/UL (ref 0–0.5)
EOSINOPHIL NFR BLD: 0.9 % (ref 0–8)
ERYTHROCYTE [DISTWIDTH] IN BLOOD BY AUTOMATED COUNT: 14 % (ref 11.5–14.5)
EST. GFR  (NO RACE VARIABLE): >60 ML/MIN/1.73 M^2
GLUCOSE SERPL-MCNC: 100 MG/DL (ref 70–110)
GRAM STN SPEC: NORMAL
GRAM STN SPEC: NORMAL
HCT VFR BLD AUTO: 38.2 % (ref 40–54)
HCV RNA SERPL QL NAA+PROBE: DETECTED
HCV RNA SPEC NAA+PROBE-ACNC: <12 IU/ML
HGB BLD-MCNC: 12.1 G/DL (ref 14–18)
IMM GRANULOCYTES # BLD AUTO: 0.02 K/UL (ref 0–0.04)
IMM GRANULOCYTES NFR BLD AUTO: 0.2 % (ref 0–0.5)
LYMPHOCYTES # BLD AUTO: 2.6 K/UL (ref 1–4.8)
LYMPHOCYTES NFR BLD: 27.8 % (ref 18–48)
MCH RBC QN AUTO: 26.7 PG (ref 27–31)
MCHC RBC AUTO-ENTMCNC: 31.7 G/DL (ref 32–36)
MCV RBC AUTO: 84 FL (ref 82–98)
MONOCYTES # BLD AUTO: 0.9 K/UL (ref 0.3–1)
MONOCYTES NFR BLD: 10 % (ref 4–15)
NEUTROPHILS # BLD AUTO: 5.7 K/UL (ref 1.8–7.7)
NEUTROPHILS NFR BLD: 60.9 % (ref 38–73)
NRBC BLD-RTO: 0 /100 WBC
PLATELET # BLD AUTO: 329 K/UL (ref 150–450)
PMV BLD AUTO: 10.2 FL (ref 9.2–12.9)
POTASSIUM SERPL-SCNC: 4.2 MMOL/L (ref 3.5–5.1)
PROT SERPL-MCNC: 7.2 G/DL (ref 6–8.4)
RBC # BLD AUTO: 4.54 M/UL (ref 4.6–6.2)
SODIUM SERPL-SCNC: 135 MMOL/L (ref 136–145)
VANCOMYCIN TROUGH SERPL-MCNC: 7.8 UG/ML (ref 10–22)
WBC # BLD AUTO: 9.34 K/UL (ref 3.9–12.7)

## 2024-09-10 PROCEDURE — 36415 COLL VENOUS BLD VENIPUNCTURE: CPT

## 2024-09-10 PROCEDURE — 63600175 PHARM REV CODE 636 W HCPCS: Performed by: FAMILY MEDICINE

## 2024-09-10 PROCEDURE — 87102 FUNGUS ISOLATION CULTURE: CPT

## 2024-09-10 PROCEDURE — 87186 SC STD MICRODIL/AGAR DIL: CPT

## 2024-09-10 PROCEDURE — 87206 SMEAR FLUORESCENT/ACID STAI: CPT

## 2024-09-10 PROCEDURE — 87522 HEPATITIS C REVRS TRNSCRPJ: CPT

## 2024-09-10 PROCEDURE — 63600175 PHARM REV CODE 636 W HCPCS: Performed by: HOSPITALIST

## 2024-09-10 PROCEDURE — 63600175 PHARM REV CODE 636 W HCPCS

## 2024-09-10 PROCEDURE — 87077 CULTURE AEROBIC IDENTIFY: CPT

## 2024-09-10 PROCEDURE — 80202 ASSAY OF VANCOMYCIN: CPT | Performed by: HOSPITALIST

## 2024-09-10 PROCEDURE — 11000001 HC ACUTE MED/SURG PRIVATE ROOM

## 2024-09-10 PROCEDURE — 87070 CULTURE OTHR SPECIMN AEROBIC: CPT

## 2024-09-10 PROCEDURE — 85025 COMPLETE CBC W/AUTO DIFF WBC: CPT | Performed by: FAMILY MEDICINE

## 2024-09-10 PROCEDURE — 25000003 PHARM REV CODE 250: Performed by: FAMILY MEDICINE

## 2024-09-10 PROCEDURE — 0J9J0ZZ DRAINAGE OF RIGHT HAND SUBCUTANEOUS TISSUE AND FASCIA, OPEN APPROACH: ICD-10-PCS | Performed by: HOSPITALIST

## 2024-09-10 PROCEDURE — 87116 MYCOBACTERIA CULTURE: CPT

## 2024-09-10 PROCEDURE — 87205 SMEAR GRAM STAIN: CPT

## 2024-09-10 PROCEDURE — 80053 COMPREHEN METABOLIC PANEL: CPT | Performed by: FAMILY MEDICINE

## 2024-09-10 PROCEDURE — 87015 SPECIMEN INFECT AGNT CONCNTJ: CPT

## 2024-09-10 PROCEDURE — 25000003 PHARM REV CODE 250

## 2024-09-10 PROCEDURE — 99223 1ST HOSP IP/OBS HIGH 75: CPT | Mod: ,,, | Performed by: ORTHOPAEDIC SURGERY

## 2024-09-10 PROCEDURE — 87075 CULTR BACTERIA EXCEPT BLOOD: CPT

## 2024-09-10 PROCEDURE — 36415 COLL VENOUS BLD VENIPUNCTURE: CPT | Performed by: FAMILY MEDICINE

## 2024-09-10 PROCEDURE — 96372 THER/PROPH/DIAG INJ SC/IM: CPT | Performed by: FAMILY MEDICINE

## 2024-09-10 PROCEDURE — 25000003 PHARM REV CODE 250: Performed by: HOSPITALIST

## 2024-09-10 RX ORDER — HYDROMORPHONE HYDROCHLORIDE 1 MG/ML
0.2 INJECTION, SOLUTION INTRAMUSCULAR; INTRAVENOUS; SUBCUTANEOUS EVERY 6 HOURS PRN
Status: DISCONTINUED | OUTPATIENT
Start: 2024-09-10 | End: 2024-09-12

## 2024-09-10 RX ORDER — MORPHINE SULFATE 4 MG/ML
4 INJECTION, SOLUTION INTRAMUSCULAR; INTRAVENOUS EVERY 4 HOURS PRN
Status: DISCONTINUED | OUTPATIENT
Start: 2024-09-10 | End: 2024-09-10

## 2024-09-10 RX ORDER — OXYCODONE HYDROCHLORIDE 10 MG/1
10 TABLET ORAL EVERY 4 HOURS PRN
Status: DISCONTINUED | OUTPATIENT
Start: 2024-09-10 | End: 2024-09-13

## 2024-09-10 RX ORDER — OXYCODONE HYDROCHLORIDE 5 MG/1
5 TABLET ORAL EVERY 4 HOURS PRN
Status: DISCONTINUED | OUTPATIENT
Start: 2024-09-10 | End: 2024-09-13 | Stop reason: HOSPADM

## 2024-09-10 RX ADMIN — OXYCODONE HYDROCHLORIDE 5 MG: 5 TABLET ORAL at 08:09

## 2024-09-10 RX ADMIN — VANCOMYCIN HYDROCHLORIDE 1250 MG: 1.25 INJECTION, POWDER, LYOPHILIZED, FOR SOLUTION INTRAVENOUS at 02:09

## 2024-09-10 RX ADMIN — MORPHINE SULFATE 4 MG: 4 INJECTION INTRAVENOUS at 03:09

## 2024-09-10 RX ADMIN — PIPERACILLIN SODIUM AND TAZOBACTAM SODIUM 4.5 G: 4; .5 INJECTION, POWDER, FOR SOLUTION INTRAVENOUS at 04:09

## 2024-09-10 RX ADMIN — OXYCODONE HYDROCHLORIDE 10 MG: 10 TABLET ORAL at 06:09

## 2024-09-10 RX ADMIN — PIPERACILLIN SODIUM AND TAZOBACTAM SODIUM 4.5 G: 4; .5 INJECTION, POWDER, FOR SOLUTION INTRAVENOUS at 11:09

## 2024-09-10 RX ADMIN — HYDROMORPHONE HYDROCHLORIDE 0.2 MG: 0.5 INJECTION, SOLUTION INTRAMUSCULAR; INTRAVENOUS; SUBCUTANEOUS at 05:09

## 2024-09-10 RX ADMIN — PIPERACILLIN SODIUM AND TAZOBACTAM SODIUM 4.5 G: 4; .5 INJECTION, POWDER, FOR SOLUTION INTRAVENOUS at 10:09

## 2024-09-10 RX ADMIN — VANCOMYCIN HYDROCHLORIDE 1000 MG: 1 INJECTION, POWDER, LYOPHILIZED, FOR SOLUTION INTRAVENOUS at 08:09

## 2024-09-10 RX ADMIN — OXYCODONE HYDROCHLORIDE 5 MG: 5 TABLET ORAL at 02:09

## 2024-09-10 RX ADMIN — HEPARIN SODIUM 5000 UNITS: 5000 INJECTION INTRAVENOUS; SUBCUTANEOUS at 10:09

## 2024-09-10 RX ADMIN — HEPARIN SODIUM 5000 UNITS: 5000 INJECTION INTRAVENOUS; SUBCUTANEOUS at 06:09

## 2024-09-10 RX ADMIN — HEPARIN SODIUM 5000 UNITS: 5000 INJECTION INTRAVENOUS; SUBCUTANEOUS at 02:09

## 2024-09-10 RX ADMIN — OXYCODONE HYDROCHLORIDE 10 MG: 10 TABLET ORAL at 10:09

## 2024-09-10 RX ADMIN — ACETAMINOPHEN 1000 MG: 500 TABLET ORAL at 08:09

## 2024-09-10 RX ADMIN — OXYCODONE HYDROCHLORIDE 10 MG: 10 TABLET ORAL at 02:09

## 2024-09-10 RX ADMIN — MORPHINE SULFATE 4 MG: 4 INJECTION INTRAVENOUS at 10:09

## 2024-09-10 NOTE — SUBJECTIVE & OBJECTIVE
"Past Medical History:   Diagnosis Date    Cellulitis and abscess of hand 03/09/2020    Left dorsal side, I&D done 3/9/20    Cigarette smoker     History of drug use     cocaine & IV heroin       Past Surgical History:   Procedure Laterality Date    INCISION AND DRAINAGE OF HAND Left 3/10/2020    Procedure: INCISION AND DRAINAGE, WRIST;  Surgeon: Asa Maciel MD;  Location: [facility];  Service: Orthopedics;  Laterality: Left;    NO PAST SURGERIES  03/09/2020       Review of patient's allergies indicates:  No Known Allergies    Current Facility-Administered Medications   Medication    acetaminophen tablet 1,000 mg    albuterol inhaler 2 puff    aluminum-magnesium hydroxide-simethicone 200-200-20 mg/5 mL suspension 30 mL    dextrose 10% bolus 125 mL 125 mL    dextrose 10% bolus 250 mL 250 mL    glucagon (human recombinant) injection 1 mg    glucose chewable tablet 16 g    glucose chewable tablet 24 g    heparin (porcine) injection 5,000 Units    melatonin tablet 6 mg    naloxone 0.4 mg/mL injection 0.02 mg    ondansetron injection 4 mg    oxyCODONE immediate release tablet 5 mg    piperacillin-tazobactam (ZOSYN) 4.5 g in D5W 100 mL IVPB (MB+)    sodium chloride 0.9% flush 10 mL    vancomycin - pharmacy to dose    vancomycin 1,250 mg in D5W 250 mL IVPB (admixture device)     Family History    None       Tobacco Use    Smoking status: Every Day     Current packs/day: 0.50     Types: Cigarettes    Smokeless tobacco: Never   Substance and Sexual Activity    Alcohol use: Yes     Comment: 6 pack of beer per week    Drug use: Not Currently     Types: Heroin, "Crack" cocaine     Comment: 3/9/20:  IV heroin     Sexual activity: Yes     Partners: Female     ROS  Constitutional: negative for fevers  Eyes: negative visual changes  ENT: negative for hearing loss  Respiratory: negative for dyspnea  Cardiovascular: negative for chest pain  Gastrointestinal: negative for abdominal pain  Genitourinary: negative for " "dysuria  Neurological: negative for headaches  Behavioral/Psych: negative for hallucinations  Endocrine: negative for temperature intolerance    Objective:     Vital Signs (Most Recent):  Temp: 100.2 °F (37.9 °C) (09/09/24 2227)  Pulse: 94 (09/09/24 2227)  Resp: 18 (09/09/24 2227)  BP: (!) 142/90 (09/09/24 2227)  SpO2: 96 % (09/09/24 2227) Vital Signs (24h Range):  Temp:  [98.2 °F (36.8 °C)-100.2 °F (37.9 °C)] 100.2 °F (37.9 °C)  Pulse:  [82-96] 94  Resp:  [15-20] 18  SpO2:  [95 %-100 %] 96 %  BP: (130-144)/(75-90) 142/90     Weight: 66.3 kg (146 lb 2.6 oz)  Height: 5' 7" (170.2 cm)  Body mass index is 22.89 kg/m².    No intake or output data in the 24 hours ending 09/10/24 0141     Ortho/SPM Exam  General:  no acute distress, appears stated age   Neuro: alert and oriented x3  Psych: normal mood  Head: normocephalic, atraumatic.  Eyes: no scleral icterus  Mouth: moist mucous membranes  Cardiovascular: extremities warm and well perfused  Lungs: breathing comfortably, equal chest rise bilat  Skin: clean, dry, intact (any exceptions noted in below musculoskeletal exam)    MSK:  RUE:  - Skin intact throughout, no open wounds  - Previous amputation to 5th digit, partial amputation to distal phalanx of 4th digit  - Moderate swelling and erythema to 3rd digit and mild swelling to dorsum of hand  - TTP to P3 of 3rd finger  - AROM and PROM of the shoulder, elbow, wrist intact without pain  - Pain with passive stretch of 3rd digit  - Axillary/AIN/PIN/Radial/Median/Ulnar Nerves assessed in isolation without deficit  - SILT throughout  - Compartments soft  - Radial artery palpated   - Capillary Refill <3s    LUE:  - Skin intact throughout, no open wounds  - No swelling  - No ecchymosis, erythema, or signs of cellulitis  - NonTTP throughout  - AROM and PROM of the shoulder, elbow, wrist, and hand intact without pain  - Axillary/AIN/PIN/Radial/Median/Ulnar Nerves assessed in isolation without deficit  - SILT throughout  - " Compartments soft  - Radial artery palpated   - Capillary Refill <3s    RLE:  - Skin intact throughout, no open wounds  - No swelling  - No ecchymosis, erythema, or signs of cellulitis  - NonTTP throughout  - AROM and PROM of the hip, knee, ankle, and foot intact without pain  - TA/EHL/Gastroc/FHL assessed in isolation without deficit  - SILT throughout  - Compartments soft  - Capillary Refill <3s      LLE:  - Skin intact throughout, no open wounds  - No swelling  - No ecchymosis, erythema, or signs of cellulitis  - NonTTP throughout  - AROM and PROM of the hip, knee, ankle, and foot intact without pain  - TA/EHL/Gastroc/FHL assessed in isolation without deficit  - SILT throughout  - Compartments soft  - Capillary Refill <3s         Significant Labs: CBC:   Recent Labs   Lab 09/09/24  1328   WBC 8.76   HGB 12.7*   HCT 39.1*        CMP:   Recent Labs   Lab 09/09/24  1328      K 4.7      CO2 22*   GLU 88   BUN 9   CREATININE 0.9   CALCIUM 9.2   PROT 7.3   ALBUMIN 3.2*   BILITOT 0.2   ALKPHOS 80   AST 17   ALT 8*   ANIONGAP 8     CRP:   Recent Labs   Lab 09/09/24  1328   CRP 36.8*     ESR: 56    All pertinent labs within the past 24 hours have been reviewed.  Significant Imaging: I have reviewed and interpreted all pertinent imaging results/findings.  XR R hand: previous 5th mid metacarpal amputation, soft tissue swelling to 3rd digit

## 2024-09-10 NOTE — PLAN OF CARE
Problem: Adult Inpatient Plan of Care  Goal: Plan of Care Review  Outcome: Progressing     Problem: Pain Acute  Goal: Optimal Pain Control and Function  Outcome: Progressing     Problem: Skin or Soft Tissue Infection  Goal: Absence of Infection Signs and Symptoms  Outcome: Progressing     Problem: Infection  Goal: Absence of Infection Signs and Symptoms  Outcome: Progressing

## 2024-09-10 NOTE — PLAN OF CARE
Met with Patient to review discharge recommendation of LTAC and is agreeable to plan    Patient/family provided list of facilities in-network with patient's payor plan. Providers that are owned, operated, or affiliated with [facility] are included on the list.     Notified that referral sent to below listed facilities from in-network list based on proximity to home/family support:   [facility]      Patient/family instructed to identify preference.    Preferred Facility: (if more than 1, listed in order of descending preference)  [facility]    If an additional preferred facility not listed above is identified, additional referral to be sent. If above facilities unable to accept, will send additional referrals to in-network providers.      09/10/24 1212   Post-Acute Status   Post-Acute Authorization Placement   Post-Acute Placement Status Referrals Sent   Discharge Plan   Discharge Plan A Long-term acute care facility (LTAC)   Discharge Plan B Long-term acute care facility (LTAC)     Prashant García RN,BSN      1226: [facility] is not in network. Referrals sent to [facility] and [facility].   Prashant García RN,BSN

## 2024-09-10 NOTE — NURSING
Nurses Note -- 4 Eyes      9/9/2024   11:30 PM      Skin assessed during: Admit      [x] No Altered Skin Integrity Present    []Prevention Measures Documented      [] Yes- Altered Skin Integrity Present or Discovered   [] LDA Added if Not in Epic (Describe Wound)   [] New Altered Skin Integrity was Present on Admit and Documented in LDA   [] Wound Image Taken    Wound Care Consulted? No    Attending Nurse:  [place] RN/Staff Member:   Sheri SAHU    Swelling to right middle finger

## 2024-09-10 NOTE — PLAN OF CARE
[facility] - Observation 11H  Discharge Assessment    Primary Care Provider: No, Primary Doctor     Discharge Assessment (most recent)       BRIEF DISCHARGE ASSESSMENT - 09/10/24 1148          Discharge Planning    Assessment Type Discharge Planning Brief Assessment     Resource/Environmental Concerns none     Support Systems Family members     Equipment Currently Used at Home none     Current Living Arrangements other (see comments)   Inpatient Drug Rehab    Patient/Family Anticipates Transition to other (see comments)   LTAC    Patient/Family Anticipated Services at Transition complementary therapies     DME Needed Upon Discharge  none     Discharge Plan A Long-term acute care facility (LTAC)     Discharge Plan B Other   [facility]                  Pt is a 54 y.o. male admitted with pain of R middle finger and has a PMH of IVDU. He is currently in drug rehab and wants to return if not on IV antibiotics. He has not required DME in the past and is independent with his ADls and iADls. He will need transportation. [facility] Discharge Packet given to patient and/or family with understanding verbalized.   name and number and estimated discharge date written on white board in patient's room with request to call for any questions or concerns.  Will continue to follow for needs.  Discharge Plan A and Plan B have been determined by review of patient's clinical status, future medical and therapeutic needs, and coverage/benefits for post-acute care in coordination with multidisciplinary team members.  Prashant García, RN,BSN

## 2024-09-10 NOTE — HPI
Max Mcgregor is a RHD 54 y.o. male with PMH significant for IV drug use and tobacco use presenting with right 3rd digit pain and swelling. He states it started around 8 days ago and has progressively gotten worse. He was homeless at that time and thinks he had a splinter from trying to throw a pallet. The splint stuck him in the dorsal aspect of the 3rd distal phalanx. He has since admitted himself to rehab where he received oral abx with no relief. Patient states his last IV drug use was around 2 weeks ago. He states he injects into his left arm. Patient denies numbness and tingling. Denies any other musculoskeletal pain or injuries. 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 Denies fevers, chills or night sweats.    They endorse IV drug use with last use two weeks ago (heroin).   They endorse tobacco use.   They deny alcohol use.   They deny immunosuppressant medications.  They deny chemotherapy.  They deny radiation therapy.

## 2024-09-10 NOTE — CARE UPDATE
Max Mcgregor is a 54 y.o. male who was admitted to hospital medicine Pt was seen by orthopedic surgery. Ortho recommended f/u with MRI MRI hand/fingers without contrast of right hand: Soft tissue edema middle/distal phalanges 3rd digit and focal well-defined fluid collection at the dorsum of the of the 3rd middle phalanx/DIP level with underlying bone marrow edema of the distal phalanx. While there is no definitive evidence for osteomyelitis of the distal phalanx at this time, the finding of bone marrow edema adjacent to the soft tissue abnormality may represent early osteomyelitis, with concomitant increased risk of development of osteomyelitis. Pt started on abx vancomycin/zosyn. Ortho performed bedside I&D. Following wound cultures. Patient will be discharged when medically ready.     Agustina Dacosta PA-C  Department of Hospital Medicine  [facility]

## 2024-09-10 NOTE — HOSPITAL COURSE
Max Mcgregor is a 54 y.o. male who was admitted to hospital medicine Pt was seen by orthopedic surgery. Ortho recommended f/u with MRI. MRI hand/fingers without contrast of right hand: Soft tissue edema middle/distal phalanges 3rd digit and focal well-defined fluid collection at the dorsum of the of the 3rd middle phalanx/DIP level with underlying bone marrow edema of the distal phalanx. While there is no definitive evidence for osteomyelitis of the distal phalanx at this time, the finding of bone marrow edema adjacent to the soft tissue abnormality may represent early osteomyelitis, with concomitant increased risk of development of osteomyelitis. Pt started and scheduled on abx vancomycin. Wound cultures growing MRSA. ID consulted in anticipation of outpatient IV abx need. ID with recs for daptomycin at discharge, PICC line placed. Patient accepted to LTAC for IV abx.  Patient medically ready for discharge. Plan to follow up with PCP, ortho, ID. Return precautions provided. Patient was seen and assessed on day of discharge. Plan of care discussed with patient, patient agreeable with plan, and all questions answered.    Physical Exam  Gen: in NAD, appears stated age  Neuro: mental status at baseline, motor, sensory, and strength grossly intact BL  HEENT: EOMI, PERRLA; no JVD appreciated  CVS: RRR, no m/r/g  Resp: lungs CTAB, no w/r/r; no belabored breathing or accessory muscle use appreciated   Abd: NTND, soft to palpation  Extrem: no UE or LE edema BL

## 2024-09-10 NOTE — ED NOTES
Pt remains in paper scrubs, resting in stretcher comfortably - with side rails up, locked, and in lowest position. Chest rise and fall noted; breathing equal, even, and unlabored. Peace Cui, remains at bedside in direct visual contact, charting per protocol every 15 minutes. No equipment or belongings are in the patients room to prevent self harm or injury. Pt aware of plan of care. No acute distress noted and no needs expressed at this time. Will continue to assess periodically.

## 2024-09-10 NOTE — SUBJECTIVE & OBJECTIVE
Interval History: MARTHA ISRAEL. Wound cultures growing gram + cocci, stopping zosyn and continuing vanc.     Review of Systems   Constitutional:  Negative for chills and fever.   Respiratory:  Negative for chest tightness and shortness of breath.    Cardiovascular:  Negative for chest pain and leg swelling.   Gastrointestinal:  Negative for abdominal pain and nausea.   Neurological:  Negative for dizziness and weakness.     Objective:     Vital Signs (Most Recent):  Temp: 98.3 °F (36.8 °C) (09/10/24 0759)  Pulse: 87 (09/10/24 0759)  Resp: 18 (09/10/24 0847)  BP: 126/83 (09/10/24 0759)  SpO2: 97 % (09/10/24 0759) Vital Signs (24h Range):  Temp:  [98.2 °F (36.8 °C)-100.2 °F (37.9 °C)] 98.3 °F (36.8 °C)  Pulse:  [82-96] 87  Resp:  [15-20] 18  SpO2:  [94 %-100 %] 97 %  BP: (126-144)/(75-90) 126/83     Weight: 66.3 kg (146 lb 2.6 oz)  Body mass index is 22.89 kg/m².  No intake or output data in the 24 hours ending 09/10/24 0957      Physical Exam  Vitals and nursing note reviewed.   Constitutional:       Appearance: He is well-developed.   Eyes:      Pupils: Pupils are equal, round, and reactive to light.   Cardiovascular:      Rate and Rhythm: Normal rate and regular rhythm.   Pulmonary:      Effort: Pulmonary effort is normal.      Breath sounds: Normal breath sounds.   Abdominal:      Palpations: Abdomen is soft.      Tenderness: There is no abdominal tenderness.   Musculoskeletal:         General: Swelling and tenderness present.      Comments: R 3rd finger wrapped, clean, dry and intact    Skin:     General: Skin is warm and dry.   Neurological:      Mental Status: He is alert and oriented to person, place, and time.   Psychiatric:         Behavior: Behavior normal.             Significant Labs: All pertinent labs within the past 24 hours have been reviewed.  CBC:   Recent Labs   Lab 09/09/24  1328 09/10/24  0317 09/11/24  0400   WBC 8.76 9.34 8.21   HGB 12.7* 12.1* 13.0*   HCT 39.1* 38.2* 41.3    778 410      CMP:   Recent Labs   Lab 09/09/24  1328 09/10/24  0317 09/11/24  0400    135* 133*   K 4.7 4.2 4.8    104 103   CO2 22* 22* 22*   GLU 88 100 96   BUN 9 13 17   CREATININE 0.9 1.1 0.8   CALCIUM 9.2 9.6 9.5   PROT 7.3 7.2 7.8   ALBUMIN 3.2* 3.0* 3.1*   BILITOT 0.2 0.2 0.4   ALKPHOS 80 81 74   AST 17 11 10   ALT 8* 6* 7*   ANIONGAP 8 9 8       Significant Imaging: I have reviewed all pertinent imaging results/findings within the past 24 hours.

## 2024-09-10 NOTE — ASSESSMENT & PLAN NOTE
Max Mcgregor is a 54 y.o. male with PMH of  IV drug use and tobacco use  presenting to the ED with around 8 days of right 3rd digit pain.  Patient had splinter to the dorsum of the right 3rd digit distal phalanx and noticed progressively worsening pain and swelling.  He self admitted to rehab a few days ago and was prescribed a trial of p.o. antibiotics with no relief.  In ED, afebrile, vital signs stable.  On exam patient has fusiform swelling of the digit and digits held in slight flexion.  He also has pain with passive stretch of the right 3rd digit.  Tenderness is mostly confined to the distal phalanx.  CRP 36.8, ESR 56.  MRI right hand ordered.  Patient received Rocephin, Zosyn and vancomycin in the ED.    - F/u MRI  - NPO as precaution  - hold further antibiotics unless concern for sepsis  - further recs pending MRI

## 2024-09-10 NOTE — ED NOTES
Assumed care of patient at this time. Pt remains in paper scrubs, resting in stretcher comfortably - with side rails up, locked, and in lowest position. Chest rise and fall noted; breathing equal, even, and unlabored. Peace Cui, remains at bedside in direct visual contact, charting per protocol every 15 minutes. No equipment or belongings are in the patients room to prevent self harm or injury. Pt aware of plan of care. No acute distress noted and no needs expressed at this time. Will continue to assess periodically.

## 2024-09-10 NOTE — ASSESSMENT & PLAN NOTE
- MRI and Orthopedic surgery consulted for concern of flexor tenosynovitis  - continue vanc and zosyn  - follow up cultures  - follow up MRI  - npo midnight  - pain control  - further management pending clinical course and future study review

## 2024-09-10 NOTE — ED NOTES
Nurses Note -- 4 Eyes      9/9/2024   7:54 PM      Skin assessed during: Admit      [] No Altered Skin Integrity Present    []Prevention Measures Documented      [x] Yes- Altered Skin Integrity Present or Discovered   [x] LDA Added if Not in Epic (Describe Wound)   [x] New Altered Skin Integrity was Present on Admit and Documented in LDA   [x] Wound Image Taken    Wound Care Consulted? No    Attending Nurse:  Es Hills RN/Staff Member:   Faustina

## 2024-09-10 NOTE — PROGRESS NOTES
Pharmacokinetic Assessment Follow Up: IV Vancomycin    Vancomycin serum concentration assessment(s):    The trough level was drawn incorrectly and cannot be used to guide therapy at this time.    Vancomycin Regimen Plan:    Change regimen to Vancomycin 1000 mg IV every 12 hours with next serum trough concentration measured at 800 prior to 4th dose on 09/12/24  - change based on the weight of the patient     Drug levels (last 3 results):  Recent Labs   Lab Result Units 09/10/24  1642   Vancomycin-Trough ug/mL 7.8*       Pharmacy will continue to follow and monitor vancomycin.    Please contact pharmacy at extension [phone] for questions regarding this assessment.    Thank you for the consult,   Aurora Dela Cruz       Patient brief summary:  Max Mcgregor is a 54 y.o. male initiated on antimicrobial therapy with IV Vancomycin for treatment of skin & soft tissue infection    The patient's current regimen is vancomycin 1,000 mg Q12H    Drug Allergies:   Review of patient's allergies indicates:  No Known Allergies    Actual Body Weight:   66.3    Renal Function:   Estimated Creatinine Clearance: 70.9 mL/min (based on SCr of 1.1 mg/dL)., - this level is his average baseline    Dialysis Method (if applicable):  N/A    CBC (last 72 hours):  Recent Labs   Lab Result Units 09/09/24  1328 09/10/24  0317   WBC K/uL 8.76 9.34   Hemoglobin g/dL 12.7* 12.1*   Hematocrit % 39.1* 38.2*   Platelets K/uL 285 329   Gran % % 61.2 60.9   Lymph % % 28.8 27.8   Mono % % 8.3 10.0   Eosinophil % % 0.9 0.9   Basophil % % 0.3 0.2   Differential Method  Automated Automated       Metabolic Panel (last 72 hours):  Recent Labs   Lab Result Units 09/09/24  1328 09/10/24  0317   Sodium mmol/L 139 135*   Potassium mmol/L 4.7 4.2   Chloride mmol/L 109 104   CO2 mmol/L 22* 22*   Glucose mg/dL 88 100   BUN mg/dL 9 13   Creatinine mg/dL 0.9 1.1   Albumin g/dL 3.2* 3.0*   Total Bilirubin mg/dL 0.2 0.2   Alkaline Phosphatase U/L 80 81   AST U/L 17 11   ALT U/L  8* 6*       Vancomycin Administrations:  vancomycin given in the last 96 hours                     vancomycin 1,250 mg in D5W 250 mL IVPB (admixture device) (mg) 1,250 mg New Bag 09/10/24 0242    vancomycin 1.75 g in 5 % dextrose 500 mL IVPB (mg) 1,750 mg New Bag 09/09/24 1445                    Microbiologic Results:  Microbiology Results (last 7 days)       Procedure Component Value Units Date/Time    Blood culture #1 **CANNOT BE ORDERED STAT** [[id number]] Collected: 09/09/24 1342    Order Status: Completed Specimen: Blood from Peripheral, Antecubital, Right Updated: 09/10/24 1422     Blood Culture, Routine No Growth to date      No Growth to date    Blood culture #2 **CANNOT BE ORDERED STAT** [[id number]] Collected: 09/09/24 1342    Order Status: Completed Specimen: Blood from Peripheral, Antecubital, Left Updated: 09/10/24 1422     Blood Culture, Routine No Growth to date      No Growth to date    Gram stain [[id number]] Collected: 09/10/24 1115    Order Status: Completed Specimen: Abscess from Finger, Right Hand Updated: 09/10/24 1406     Gram Stain Result Few WBC's      Moderate Gram positive cocci    Aerobic culture [[id number]] Collected: 09/10/24 1115    Order Status: Sent Specimen: Abscess from Finger, Right Hand Updated: 09/10/24 1132    Culture, Anaerobic [[id number]] Collected: 09/10/24 1115    Order Status: Sent Specimen: Abscess from Finger, Right Hand Updated: 09/10/24 1132    AFB Culture & Smear [[id number]] Collected: 09/10/24 1115    Order Status: Sent Specimen: Abscess from Finger, Right Hand Updated: 09/10/24 1131    Fungus culture [[id number]] Collected: 09/10/24 1115    Order Status: Sent Specimen: Abscess from Finger, Right Hand Updated: 09/10/24 1131

## 2024-09-10 NOTE — SUBJECTIVE & OBJECTIVE
"Past Medical History:   Diagnosis Date    Cellulitis and abscess of hand 03/09/2020    Left dorsal side, I&D done 3/9/20    Cigarette smoker     History of drug use     cocaine & IV heroin       Past Surgical History:   Procedure Laterality Date    INCISION AND DRAINAGE OF HAND Left 3/10/2020    Procedure: INCISION AND DRAINAGE, WRIST;  Surgeon: Asa Maciel MD;  Location: [facility];  Service: Orthopedics;  Laterality: Left;    NO PAST SURGERIES  03/09/2020       Review of patient's allergies indicates:  No Known Allergies    No current facility-administered medications on file prior to encounter.     Current Outpatient Medications on File Prior to Encounter   Medication Sig    hydrocortisone 2.5 % cream Apply topically 4 (four) times daily. Use as needed until the rash is gone    ketoconazole (NIZORAL) 2 % cream Apply topically 2 (two) times daily.     Family History    None       Tobacco Use    Smoking status: Every Day     Current packs/day: 0.50     Types: Cigarettes    Smokeless tobacco: Never   Substance and Sexual Activity    Alcohol use: Yes     Comment: 6 pack of beer per week    Drug use: Not Currently     Types: Heroin, "Crack" cocaine     Comment: 3/9/20:  IV heroin     Sexual activity: Yes     Partners: Female     Review of Systems   Constitutional:  Negative for chills, fatigue and fever.   HENT:  Negative for sore throat and trouble swallowing.    Eyes:  Negative for photophobia and visual disturbance.   Respiratory:  Negative for cough, shortness of breath and wheezing.    Cardiovascular:  Negative for chest pain, palpitations and leg swelling.   Gastrointestinal:  Negative for abdominal distention, abdominal pain, diarrhea, nausea and vomiting.   Genitourinary:  Negative for dysuria and hematuria.   Musculoskeletal:  Positive for arthralgias and myalgias. Negative for neck pain and neck stiffness.   Skin:  Positive for color change and rash.   Neurological:  Negative for seizures, syncope, " weakness, light-headedness, numbness and headaches.   Psychiatric/Behavioral:  Negative for confusion and decreased concentration.      Objective:     Vital Signs (Most Recent):  Temp: 100.2 °F (37.9 °C) (09/09/24 2227)  Pulse: 94 (09/09/24 2227)  Resp: 18 (09/09/24 2227)  BP: (!) 142/90 (09/09/24 2227)  SpO2: 96 % (09/09/24 2227) Vital Signs (24h Range):  Temp:  [98.2 °F (36.8 °C)-100.2 °F (37.9 °C)] 100.2 °F (37.9 °C)  Pulse:  [82-96] 94  Resp:  [15-20] 18  SpO2:  [95 %-100 %] 96 %  BP: (130-144)/(75-90) 142/90     Weight: 66.3 kg (146 lb 2.6 oz)  Body mass index is 22.89 kg/m².     Physical Exam           Significant Labs: All pertinent labs within the past 24 hours have been reviewed.  CBC:   Recent Labs   Lab 09/09/24  1328   WBC 8.76   HGB 12.7*   HCT 39.1*        CMP:   Recent Labs   Lab 09/09/24  1328      K 4.7      CO2 22*   GLU 88   BUN 9   CREATININE 0.9   CALCIUM 9.2   PROT 7.3   ALBUMIN 3.2*   BILITOT 0.2   ALKPHOS 80   AST 17   ALT 8*   ANIONGAP 8       Significant Imaging: I have reviewed all pertinent imaging results/findings within the past 24 hours.

## 2024-09-10 NOTE — ED NOTES
Completed a sandwich & 2 servings of Orange juice. Currently resting w/ body covered w/blankets, rise/fall of chest noted. DVC maintained for safety.

## 2024-09-10 NOTE — PROGRESS NOTES
"Pharmacokinetic Initial Assessment: IV Vancomycin    Assessment/Plan:    Initiate intravenous vancomycin with loading dose of 1750 mg once, done in ED, followed by a maintenance dose of vancomycin 1250 mg IV every 12 hours.  Desired empiric serum trough concentration is 10 to 20 mcg/mL.  Draw vancomycin trough level 60 min prior to fourth dose on 09/11/2024 at 0130.  Pharmacy will continue to follow and monitor vancomycin.      Please contact pharmacy at extension [phone] with any questions regarding this assessment.     Thank you for the consult,   Jesus Heard       Patient brief summary:  Max Mcgregor is a 54 y.o. male initiated on antimicrobial therapy with IV Vancomycin for treatment of suspected skin & soft tissue infection.    Drug Allergies:   Review of patient's allergies indicates:  No Known Allergies    Actual Body Weight:   85.7 kg    Renal Function:   Estimated Creatinine Clearance: 96.9 mL/min (based on SCr of 0.9 mg/dL).    CBC (last 72 hours):  Recent Labs   Lab Result Units 09/09/24  1328   WBC K/uL 8.76   Hemoglobin g/dL 12.7*   Hematocrit % 39.1*   Platelets K/uL 285   Gran % % 61.2   Lymph % % 28.8   Mono % % 8.3   Eosinophil % % 0.9   Basophil % % 0.3   Differential Method  Automated       Metabolic Panel (last 72 hours):  Recent Labs   Lab Result Units 09/09/24  1328   Sodium mmol/L 139   Potassium mmol/L 4.7   Chloride mmol/L 109   CO2 mmol/L 22*   Glucose mg/dL 88   BUN mg/dL 9   Creatinine mg/dL 0.9   Albumin g/dL 3.2*   Total Bilirubin mg/dL 0.2   Alkaline Phosphatase U/L 80   AST U/L 17   ALT U/L 8*       Drug levels (last 3 results):  No results for input(s): "VANCOMYCINRA", "VANCORANDOM", "VANCOMYCINPE", "VANCOPEAK", "VANCOMYCINTR", "VANCOTROUGH" in the last 72 hours.    Microbiologic Results:  Microbiology Results (last 7 days)       Procedure Component Value Units Date/Time    Blood culture #1 **CANNOT BE ORDERED STAT** [[id number]] Collected: 09/09/24 1342    Order Status: Sent " Specimen: Blood from Peripheral, Antecubital, Right Updated: 09/09/24 1404    Blood culture #2 **CANNOT BE ORDERED STAT** [[id number]] Collected: 09/09/24 1342    Order Status: Sent Specimen: Blood from Peripheral, Antecubital, Left Updated: 09/09/24 6557

## 2024-09-10 NOTE — HPI
54-year-old male with a history of IV drug use presents with pain to the right 3rd finger. He is currently on rehab voluntarily for reported heroin abuse. Symptoms started over the weekend. Worse today. He denies a history of injecting into the right arm. He says he usually injects into the left. He has a remote history of injury to the right hand from work. Patient denies nausea, vomiting, diarrhea, fever, cough, shortness of breath, chest pain, abdominal pain, or dysuria. He has had cellulitis and abscess  The patients available PMH, PSH, Social History, medications, allergies, and triage vital signs were reviewed just prior to their medical evaluation.     In the ED patient afebrile and hemodynamically stable saturating well on room air at rest. ESR and CRP elevated. MRI ordered and Orthopedic surgery consulted for concern of possible flexor tenosynovitis. Patient started on vanc and zosyn and admitted to the care of medicine for further evaluation and management.

## 2024-09-10 NOTE — CONSULTS
[facility]  Orthopedics  Consult Note    Patient Name: Max Mcgregor  MRN: [id number]  Admission Date: 9/9/2024  Hospital Length of Stay: 0 days  Attending Provider: Mirtha Shannon MD  Primary Care Provider: Vida, Primary Doctor        Inpatient consult to Orthopedic Surgery  Consult performed by: BHUPINDER Santiago MD  Consult ordered by: Abhi Cintron MD        Subjective:     Principal Problem:Pain of right middle finger    Chief Complaint:   Chief Complaint   Patient presents with    Finger Pain     Formal voluntary from [place]. Swelling to R middle finger x2 weeks        HPI: Max Mcgregor is a RHD 54 y.o. male with PMH significant for IV drug use and tobacco use presenting with right 3rd digit pain and swelling. He states it started around 8 days ago and has progressively gotten worse. He was homeless at that time and thinks he had a splinter from trying to throw a pallet. The splint stuck him in the dorsal aspect of the 3rd distal phalanx. He has since admitted himself to rehab where he received oral abx with no relief. Patient states his last IV drug use was around 2 weeks ago. He states he injects into his left arm. Patient denies numbness and tingling. Denies any other musculoskeletal pain or injuries. 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 Denies fevers, chills or night sweats.    They endorse IV drug use with last use two weeks ago (heroin).   They endorse tobacco use.   They deny alcohol use.   They deny immunosuppressant medications.  They deny chemotherapy.  They deny radiation therapy.       Past Medical History:   Diagnosis Date    Cellulitis and abscess of hand 03/09/2020    Left dorsal side, I&D done 3/9/20    Cigarette smoker     History of drug use     cocaine & IV heroin       Past Surgical History:   Procedure Laterality Date    INCISION AND DRAINAGE OF HAND Left  "3/10/2020    Procedure: INCISION AND DRAINAGE, WRIST;  Surgeon: Asa Maciel MD;  Location: [facility];  Service: Orthopedics;  Laterality: Left;    NO PAST SURGERIES  03/09/2020       Review of patient's allergies indicates:  No Known Allergies    Current Facility-Administered Medications   Medication    acetaminophen tablet 1,000 mg    albuterol inhaler 2 puff    aluminum-magnesium hydroxide-simethicone 200-200-20 mg/5 mL suspension 30 mL    dextrose 10% bolus 125 mL 125 mL    dextrose 10% bolus 250 mL 250 mL    glucagon (human recombinant) injection 1 mg    glucose chewable tablet 16 g    glucose chewable tablet 24 g    heparin (porcine) injection 5,000 Units    melatonin tablet 6 mg    naloxone 0.4 mg/mL injection 0.02 mg    ondansetron injection 4 mg    oxyCODONE immediate release tablet 5 mg    piperacillin-tazobactam (ZOSYN) 4.5 g in D5W 100 mL IVPB (MB+)    sodium chloride 0.9% flush 10 mL    vancomycin - pharmacy to dose    vancomycin 1,250 mg in D5W 250 mL IVPB (admixture device)     Family History    None       Tobacco Use    Smoking status: Every Day     Current packs/day: 0.50     Types: Cigarettes    Smokeless tobacco: Never   Substance and Sexual Activity    Alcohol use: Yes     Comment: 6 pack of beer per week    Drug use: Not Currently     Types: Heroin, "Crack" cocaine     Comment: 3/9/20:  IV heroin     Sexual activity: Yes     Partners: Female     ROS  Constitutional: negative for fevers  Eyes: negative visual changes  ENT: negative for hearing loss  Respiratory: negative for dyspnea  Cardiovascular: negative for chest pain  Gastrointestinal: negative for abdominal pain  Genitourinary: negative for dysuria  Neurological: negative for headaches  Behavioral/Psych: negative for hallucinations  Endocrine: negative for temperature intolerance    Objective:     Vital Signs (Most Recent):  Temp: 100.2 °F (37.9 °C) (09/09/24 2227)  Pulse: 94 (09/09/24 2227)  Resp: 18 (09/09/24 2227)  BP: (!) 142/90 " "(09/09/24 2227)  SpO2: 96 % (09/09/24 2227) Vital Signs (24h Range):  Temp:  [98.2 °F (36.8 °C)-100.2 °F (37.9 °C)] 100.2 °F (37.9 °C)  Pulse:  [82-96] 94  Resp:  [15-20] 18  SpO2:  [95 %-100 %] 96 %  BP: (130-144)/(75-90) 142/90     Weight: 66.3 kg (146 lb 2.6 oz)  Height: 5' 7" (170.2 cm)  Body mass index is 22.89 kg/m².    No intake or output data in the 24 hours ending 09/10/24 0141     Ortho/SPM Exam  General:  no acute distress, appears stated age   Neuro: alert and oriented x3  Psych: normal mood  Head: normocephalic, atraumatic.  Eyes: no scleral icterus  Mouth: moist mucous membranes  Cardiovascular: extremities warm and well perfused  Lungs: breathing comfortably, equal chest rise bilat  Skin: clean, dry, intact (any exceptions noted in below musculoskeletal exam)    MSK:  RUE:  - Skin intact throughout, no open wounds  - Previous amputation to 5th digit, partial amputation to distal phalanx of 4th digit  - Moderate swelling and erythema to 3rd digit and mild swelling to dorsum of hand  - TTP to P3 of 3rd finger  - AROM and PROM of the shoulder, elbow, wrist intact without pain  - Pain with passive stretch of 3rd digit  - Axillary/AIN/PIN/Radial/Median/Ulnar Nerves assessed in isolation without deficit  - SILT throughout  - Compartments soft  - Radial artery palpated   - Capillary Refill <3s    LUE:  - Skin intact throughout, no open wounds  - No swelling  - No ecchymosis, erythema, or signs of cellulitis  - NonTTP throughout  - AROM and PROM of the shoulder, elbow, wrist, and hand intact without pain  - Axillary/AIN/PIN/Radial/Median/Ulnar Nerves assessed in isolation without deficit  - SILT throughout  - Compartments soft  - Radial artery palpated   - Capillary Refill <3s    RLE:  - Skin intact throughout, no open wounds  - No swelling  - No ecchymosis, erythema, or signs of cellulitis  - NonTTP throughout  - AROM and PROM of the hip, knee, ankle, and foot intact without pain  - TA/EHL/Gastroc/FHL " assessed in isolation without deficit  - SILT throughout  - Compartments soft  - Capillary Refill <3s      LLE:  - Skin intact throughout, no open wounds  - No swelling  - No ecchymosis, erythema, or signs of cellulitis  - NonTTP throughout  - AROM and PROM of the hip, knee, ankle, and foot intact without pain  - TA/EHL/Gastroc/FHL assessed in isolation without deficit  - SILT throughout  - Compartments soft  - Capillary Refill <3s         Significant Labs: CBC:   Recent Labs   Lab 09/09/24  1328   WBC 8.76   HGB 12.7*   HCT 39.1*        CMP:   Recent Labs   Lab 09/09/24  1328      K 4.7      CO2 22*   GLU 88   BUN 9   CREATININE 0.9   CALCIUM 9.2   PROT 7.3   ALBUMIN 3.2*   BILITOT 0.2   ALKPHOS 80   AST 17   ALT 8*   ANIONGAP 8     CRP:   Recent Labs   Lab 09/09/24  1328   CRP 36.8*     ESR: 56    All pertinent labs within the past 24 hours have been reviewed.  Significant Imaging: I have reviewed and interpreted all pertinent imaging results/findings.  XR R hand: previous 5th mid metacarpal amputation, soft tissue swelling to 3rd digit  Assessment/Plan:     * Pain of right middle finger  Max Mcgregor is a 54 y.o. male with PMH of  IV drug use and tobacco use  presenting to the ED with around 8 days of right 3rd digit pain.  Patient had splinter to the dorsum of the right 3rd digit distal phalanx and noticed progressively worsening pain and swelling.  He self admitted to rehab a few days ago and was prescribed a trial of p.o. antibiotics with no relief.  In ED, afebrile, vital signs stable.  Hepatitis-C antibody reactive. On exam patient has fusiform swelling of the digit and digits held in slight flexion.  He also has pain with passive stretch of the right 3rd digit.  Tenderness is mostly confined to the distal phalanx.  CRP 36.8, ESR 56.  MRI right hand ordered.  Patient received Rocephin, Zosyn and vancomycin in the ED.    - F/u MRI  - NPO as precaution  - hold further antibiotics unless  concern for sepsis  - further recs pending MRI              BHUPINDER Santiago MD  Orthopedics  [facility] - [facility]

## 2024-09-10 NOTE — ED NOTES
Dr. Cintron @ bedside. Patient did not respond, appears asleep w/ rise/fall of chest noted. DVC maintained for safety.

## 2024-09-10 NOTE — H&P
[facility] [address] [place] [address] Medicine  History & Physical    Patient Name: Max Mcgregor  MRN: [id number]  Patient Class: OP- Observation  Admission Date: 9/9/2024  Attending Physician: Mirtha Shannon MD   Primary Care Provider: Vida Primary Doctor         Patient information was obtained from patient, past medical records, and ER records.     Subjective:     Principal Problem:Cellulitis of right hand    Chief Complaint:   Chief Complaint   Patient presents with    Finger Pain     Formal voluntary from [place]. Swelling to R middle finger x2 weeks        HPI: 54-year-old male with a history of IV drug use presents with pain to the right 3rd finger. He is currently on rehab voluntarily for reported heroin abuse. Symptoms started over the weekend. Worse today. He denies a history of injecting into the right arm. He says he usually injects into the left. He has a remote history of injury to the right hand from work. Patient denies nausea, vomiting, diarrhea, fever, cough, shortness of breath, chest pain, abdominal pain, or dysuria. He has had cellulitis and abscess  The patients available PMH, PSH, Social History, medications, allergies, and triage vital signs were reviewed just prior to their medical evaluation.     In the ED patient afebrile and hemodynamically stable saturating well on room air at rest. ESR and CRP elevated. MRI ordered and Orthopedic surgery consulted for concern of possible flexor tenosynovitis. Patient started on vanc and zosyn and admitted to the care of medicine for further evaluation and management.     Past Medical History:   Diagnosis Date    Cellulitis and abscess of hand 03/09/2020    Left dorsal side, I&D done 3/9/20    Cigarette smoker     History of drug use     cocaine & IV heroin       Past Surgical History:   Procedure Laterality Date    INCISION AND DRAINAGE OF HAND Left 3/10/2020    Procedure: INCISION AND DRAINAGE, WRIST;  Surgeon: Asa Maciel MD;  Location:  "[facility] OR;  Service: Orthopedics;  Laterality: Left;    NO PAST SURGERIES  03/09/2020       Review of patient's allergies indicates:  No Known Allergies    No current facility-administered medications on file prior to encounter.     Current Outpatient Medications on File Prior to Encounter   Medication Sig    hydrocortisone 2.5 % cream Apply topically 4 (four) times daily. Use as needed until the rash is gone    ketoconazole (NIZORAL) 2 % cream Apply topically 2 (two) times daily.     Family History    None       Tobacco Use    Smoking status: Every Day     Current packs/day: 0.50     Types: Cigarettes    Smokeless tobacco: Never   Substance and Sexual Activity    Alcohol use: Yes     Comment: 6 pack of beer per week    Drug use: Not Currently     Types: Heroin, "Crack" cocaine     Comment: 3/9/20:  IV heroin     Sexual activity: Yes     Partners: Female     Review of Systems   Constitutional:  Negative for chills, fatigue and fever.   HENT:  Negative for sore throat and trouble swallowing.    Eyes:  Negative for photophobia and visual disturbance.   Respiratory:  Negative for cough, shortness of breath and wheezing.    Cardiovascular:  Negative for chest pain, palpitations and leg swelling.   Gastrointestinal:  Negative for abdominal distention, abdominal pain, diarrhea, nausea and vomiting.   Genitourinary:  Negative for dysuria and hematuria.   Musculoskeletal:  Positive for arthralgias and myalgias. Negative for neck pain and neck stiffness.   Skin:  Positive for color change and rash.   Neurological:  Negative for seizures, syncope, weakness, light-headedness, numbness and headaches.   Psychiatric/Behavioral:  Negative for confusion and decreased concentration.      Objective:     Vital Signs (Most Recent):  Temp: 100.2 °F (37.9 °C) (09/09/24 2227)  Pulse: 94 (09/09/24 2227)  Resp: 18 (09/09/24 2227)  BP: (!) 142/90 (09/09/24 2227)  SpO2: 96 % (09/09/24 2227) Vital Signs (24h Range):  Temp:  [98.2 °F (36.8 " °C)-100.2 °F (37.9 °C)] 100.2 °F (37.9 °C)  Pulse:  [82-96] 94  Resp:  [15-20] 18  SpO2:  [95 %-100 %] 96 %  BP: (130-144)/(75-90) 142/90     Weight: 66.3 kg (146 lb 2.6 oz)  Body mass index is 22.89 kg/m².     Physical Exam           Significant Labs: All pertinent labs within the past 24 hours have been reviewed.  CBC:   Recent Labs   Lab 09/09/24  1328   WBC 8.76   HGB 12.7*   HCT 39.1*        CMP:   Recent Labs   Lab 09/09/24  1328      K 4.7      CO2 22*   GLU 88   BUN 9   CREATININE 0.9   CALCIUM 9.2   PROT 7.3   ALBUMIN 3.2*   BILITOT 0.2   ALKPHOS 80   AST 17   ALT 8*   ANIONGAP 8       Significant Imaging: I have reviewed all pertinent imaging results/findings within the past 24 hours.  Assessment/Plan:     * Cellulitis of right hand  - MRI and Orthopedic surgery consulted for concern of flexor tenosynovitis  - continue vanc and zosyn  - follow up cultures  - follow up MRI  - npo midnight  - pain control  - further management pending clinical course and future study review      Hepatitis C antibody test positive  - follow up Hep C quantitative        VTE Risk Mitigation (From admission, onward)           Ordered     heparin (porcine) injection 5,000 Units  Every 8 hours         09/09/24 1905     IP VTE HIGH RISK PATIENT  Once         09/09/24 1905     Place sequential compression device  Until discontinued         09/09/24 1905                       On 09/10/2024, patient should be placed in hospital observation services under my care.        Pharmacokinetic Initial Assessment: IV Vancomycin    Assessment/Plan:    Initiate intravenous vancomycin with loading dose of 1750 mg once, done in ED, followed by a maintenance dose of vancomycin 1250 mg IV every 12 hours.  Desired empiric serum trough concentration is 10 to 20 mcg/mL.  Draw vancomycin trough level 60 min prior to fourth dose on 09/11/2024 at 0130.  Pharmacy will continue to follow and monitor vancomycin.      Please contact  "pharmacy at [facility] [phone] with any questions regarding this assessment.     Thank you for the consult,   Jesus Heard       Patient brief summary:  Max Mcgregor is a 54 y.o. male initiated on antimicrobial therapy with IV Vancomycin for treatment of suspected skin & soft tissue infection.    Drug Allergies:   Review of patient's allergies indicates:  No Known Allergies    Actual Body Weight:   85.7 kg    Renal Function:   Estimated Creatinine Clearance: 96.9 mL/min (based on SCr of 0.9 mg/dL).    CBC (last 72 hours):  Recent Labs   Lab Result Units 09/09/24  1328   WBC K/uL 8.76   Hemoglobin g/dL 12.7*   Hematocrit % 39.1*   Platelets K/uL 285   Gran % % 61.2   Lymph % % 28.8   Mono % % 8.3   Eosinophil % % 0.9   Basophil % % 0.3   Differential Method  Automated       Metabolic Panel (last 72 hours):  Recent Labs   Lab Result Units 09/09/24  1328   Sodium mmol/L 139   Potassium mmol/L 4.7   Chloride mmol/L 109   CO2 mmol/L 22*   Glucose mg/dL 88   BUN mg/dL 9   Creatinine mg/dL 0.9   Albumin g/dL 3.2*   Total Bilirubin mg/dL 0.2   Alkaline Phosphatase U/L 80   AST U/L 17   ALT U/L 8*       Drug levels (last 3 results):  No results for input(s): "VANCOMYCINRA", "VANCORANDOM", "VANCOMYCINPE", "VANCOPEAK", "VANCOMYCINTR", "VANCOTROUGH" in the last 72 hours.    Microbiologic Results:  Microbiology Results (last 7 days)       Procedure Component Value Units Date/Time    Blood culture #1 **CANNOT BE ORDERED STAT** [[id number]] Collected: 09/09/24 1342    Order Status: Sent Specimen: Blood from Peripheral, Antecubital, Right Updated: 09/09/24 1404    Blood culture #2 **CANNOT BE ORDERED STAT** [[id number]] Collected: 09/09/24 1342    Order Status: Sent Specimen: Blood from Peripheral, Antecubital, Left Updated: 09/09/24 1404              Antonio Tucker MD  Department of Hospital Medicine  [facility]          "

## 2024-09-11 PROBLEM — M86.9: Status: ACTIVE | Noted: 2024-09-11

## 2024-09-11 LAB
ALBUMIN SERPL BCP-MCNC: 3.1 G/DL (ref 3.5–5.2)
ALP SERPL-CCNC: 74 U/L (ref 55–135)
ALT SERPL W/O P-5'-P-CCNC: 7 U/L (ref 10–44)
ANION GAP SERPL CALC-SCNC: 8 MMOL/L (ref 8–16)
AST SERPL-CCNC: 10 U/L (ref 10–40)
BASOPHILS # BLD AUTO: 0.03 K/UL (ref 0–0.2)
BASOPHILS NFR BLD: 0.4 % (ref 0–1.9)
BILIRUB SERPL-MCNC: 0.4 MG/DL (ref 0.1–1)
BUN SERPL-MCNC: 17 MG/DL (ref 6–20)
CALCIUM SERPL-MCNC: 9.5 MG/DL (ref 8.7–10.5)
CHLORIDE SERPL-SCNC: 103 MMOL/L (ref 95–110)
CO2 SERPL-SCNC: 22 MMOL/L (ref 23–29)
CREAT SERPL-MCNC: 0.8 MG/DL (ref 0.5–1.4)
DIFFERENTIAL METHOD BLD: ABNORMAL
EOSINOPHIL # BLD AUTO: 0.1 K/UL (ref 0–0.5)
EOSINOPHIL NFR BLD: 0.7 % (ref 0–8)
ERYTHROCYTE [DISTWIDTH] IN BLOOD BY AUTOMATED COUNT: 14.1 % (ref 11.5–14.5)
EST. GFR  (NO RACE VARIABLE): >60 ML/MIN/1.73 M^2
GLUCOSE SERPL-MCNC: 96 MG/DL (ref 70–110)
HCT VFR BLD AUTO: 41.3 % (ref 40–54)
HGB BLD-MCNC: 13 G/DL (ref 14–18)
IMM GRANULOCYTES # BLD AUTO: 0.04 K/UL (ref 0–0.04)
IMM GRANULOCYTES NFR BLD AUTO: 0.5 % (ref 0–0.5)
LYMPHOCYTES # BLD AUTO: 2.2 K/UL (ref 1–4.8)
LYMPHOCYTES NFR BLD: 27.2 % (ref 18–48)
MCH RBC QN AUTO: 26.4 PG (ref 27–31)
MCHC RBC AUTO-ENTMCNC: 31.5 G/DL (ref 32–36)
MCV RBC AUTO: 84 FL (ref 82–98)
MONOCYTES # BLD AUTO: 0.8 K/UL (ref 0.3–1)
MONOCYTES NFR BLD: 9.6 % (ref 4–15)
NEUTROPHILS # BLD AUTO: 5.1 K/UL (ref 1.8–7.7)
NEUTROPHILS NFR BLD: 61.6 % (ref 38–73)
NRBC BLD-RTO: 0 /100 WBC
PLATELET # BLD AUTO: 346 K/UL (ref 150–450)
PMV BLD AUTO: 10.1 FL (ref 9.2–12.9)
POTASSIUM SERPL-SCNC: 4.8 MMOL/L (ref 3.5–5.1)
PROT SERPL-MCNC: 7.8 G/DL (ref 6–8.4)
RBC # BLD AUTO: 4.93 M/UL (ref 4.6–6.2)
SODIUM SERPL-SCNC: 133 MMOL/L (ref 136–145)
WBC # BLD AUTO: 8.21 K/UL (ref 3.9–12.7)

## 2024-09-11 PROCEDURE — 63600175 PHARM REV CODE 636 W HCPCS

## 2024-09-11 PROCEDURE — 36415 COLL VENOUS BLD VENIPUNCTURE: CPT | Performed by: FAMILY MEDICINE

## 2024-09-11 PROCEDURE — 25000003 PHARM REV CODE 250: Performed by: FAMILY MEDICINE

## 2024-09-11 PROCEDURE — 80053 COMPREHEN METABOLIC PANEL: CPT | Performed by: FAMILY MEDICINE

## 2024-09-11 PROCEDURE — 85025 COMPLETE CBC W/AUTO DIFF WBC: CPT | Performed by: FAMILY MEDICINE

## 2024-09-11 PROCEDURE — 25000003 PHARM REV CODE 250

## 2024-09-11 PROCEDURE — 25000003 PHARM REV CODE 250: Performed by: HOSPITALIST

## 2024-09-11 PROCEDURE — 11000001 HC ACUTE MED/SURG PRIVATE ROOM

## 2024-09-11 PROCEDURE — 63600175 PHARM REV CODE 636 W HCPCS: Performed by: FAMILY MEDICINE

## 2024-09-11 PROCEDURE — 63600175 PHARM REV CODE 636 W HCPCS: Performed by: HOSPITALIST

## 2024-09-11 PROCEDURE — S4991 NICOTINE PATCH NONLEGEND: HCPCS

## 2024-09-11 RX ORDER — IBUPROFEN 200 MG
1 TABLET ORAL DAILY
Status: DISCONTINUED | OUTPATIENT
Start: 2024-09-11 | End: 2024-09-13 | Stop reason: HOSPADM

## 2024-09-11 RX ADMIN — HEPARIN SODIUM 5000 UNITS: 5000 INJECTION INTRAVENOUS; SUBCUTANEOUS at 02:09

## 2024-09-11 RX ADMIN — PIPERACILLIN SODIUM AND TAZOBACTAM SODIUM 4.5 G: 4; .5 INJECTION, POWDER, FOR SOLUTION INTRAVENOUS at 04:09

## 2024-09-11 RX ADMIN — HYDROMORPHONE HYDROCHLORIDE 0.2 MG: 0.5 INJECTION, SOLUTION INTRAMUSCULAR; INTRAVENOUS; SUBCUTANEOUS at 06:09

## 2024-09-11 RX ADMIN — OXYCODONE HYDROCHLORIDE 10 MG: 10 TABLET ORAL at 10:09

## 2024-09-11 RX ADMIN — HEPARIN SODIUM 5000 UNITS: 5000 INJECTION INTRAVENOUS; SUBCUTANEOUS at 09:09

## 2024-09-11 RX ADMIN — ACETAMINOPHEN 1000 MG: 500 TABLET ORAL at 12:09

## 2024-09-11 RX ADMIN — OXYCODONE HYDROCHLORIDE 10 MG: 10 TABLET ORAL at 02:09

## 2024-09-11 RX ADMIN — OXYCODONE HYDROCHLORIDE 10 MG: 10 TABLET ORAL at 08:09

## 2024-09-11 RX ADMIN — HEPARIN SODIUM 5000 UNITS: 5000 INJECTION INTRAVENOUS; SUBCUTANEOUS at 06:09

## 2024-09-11 RX ADMIN — HYDROMORPHONE HYDROCHLORIDE 0.2 MG: 0.5 INJECTION, SOLUTION INTRAMUSCULAR; INTRAVENOUS; SUBCUTANEOUS at 12:09

## 2024-09-11 RX ADMIN — VANCOMYCIN HYDROCHLORIDE 1000 MG: 1 INJECTION, POWDER, LYOPHILIZED, FOR SOLUTION INTRAVENOUS at 06:09

## 2024-09-11 RX ADMIN — NICOTINE 1 PATCH: 21 PATCH, EXTENDED RELEASE TRANSDERMAL at 09:09

## 2024-09-11 RX ADMIN — OXYCODONE HYDROCHLORIDE 10 MG: 10 TABLET ORAL at 04:09

## 2024-09-11 NOTE — PLAN OF CARE
Problem: Adult Inpatient Plan of Care  Goal: Plan of Care Review  Outcome: Progressing  Goal: Patient-Specific Goal (Individualized)  Outcome: Progressing  Goal: Absence of Hospital-Acquired Illness or Injury  Outcome: Progressing  Goal: Optimal Comfort and Wellbeing  Outcome: Progressing  Goal: Readiness for Transition of Care  Outcome: Progressing     Problem: Pain Acute  Goal: Optimal Pain Control and Function  Outcome: Progressing     Problem: Skin or Soft Tissue Infection  Goal: Absence of Infection Signs and Symptoms  Outcome: Progressing     Problem: Infection  Goal: Absence of Infection Signs and Symptoms  Outcome: Progressing   Pt progressing toward goals. No distress noted. No falls or injuries during shift. Pt bed in lowest position. Side rails x2.  Call bell and personal belongs within reach. Safety precautions maintained.

## 2024-09-11 NOTE — ASSESSMENT & PLAN NOTE
- MRI and Orthopedic surgery consulted for concern of flexor tenosynovitis  - continue vanc and zosyn  - follow up cultures              - growing staph aureus              - stopping zosyn, continue vanc  - follow up MRI              - soft tissue edema middle/distal phalanges 3rd digit and focal well-defined fluid collection at the dorsum of the of the 3rd middle phalanx/DIP level with underlying bone marrow edema of the distal phalanx. finding of bone marrow edema adjacent to the soft tissue abnormality may represent early osteomyelitis, with concomitant increased risk of development of osteomyelitis.   - pain control  - further management pending clinical course and future study review  - per ortho:   S/p Bedside I&D 9/10. CRP 39.5 from 36.8. Clinically improving.  - Repeat CRP 9/13  - Wound check/dressing change 9/13  - Abx: Vanc  - Ok for diet today  - NPO midnight  - Cultures: Staph aureus  - ID consulted for anticipation of iv abx

## 2024-09-11 NOTE — PLAN OF CARE
Problem: Adult Inpatient Plan of Care  Goal: Plan of Care Review  Outcome: Progressing  Goal: Patient-Specific Goal (Individualized)  Outcome: Progressing  Goal: Absence of Hospital-Acquired Illness or Injury  Outcome: Progressing  Goal: Optimal Comfort and Wellbeing  Outcome: Progressing  Goal: Readiness for Transition of Care  Outcome: Progressing     Problem: Pain Acute  Goal: Optimal Pain Control and Function  Outcome: Progressing     Problem: Skin or Soft Tissue Infection  Goal: Absence of Infection Signs and Symptoms  Outcome: Progressing     Problem: Infection  Goal: Absence of Infection Signs and Symptoms  Outcome: Progressing

## 2024-09-11 NOTE — PROGRESS NOTES
[facility] [address] [place] [address] Medicine  Progress Note    Patient Name: Max Mcgregor  MRN: [id number]  Patient Class: IP- Inpatient   Admission Date: 9/9/2024  Length of Stay: 1 days  Attending Physician: Mirtha Shannon MD  Primary Care Provider: Vida, Primary Doctor        Subjective:     Principal Problem:Osteomyelitis of hand        HPI:  54-year-old male with a history of IV drug use presents with pain to the right 3rd finger. He is currently on rehab voluntarily for reported heroin abuse. Symptoms started over the weekend. Worse today. He denies a history of injecting into the right arm. He says he usually injects into the left. He has a remote history of injury to the right hand from work. Patient denies nausea, vomiting, diarrhea, fever, cough, shortness of breath, chest pain, abdominal pain, or dysuria. He has had cellulitis and abscess  The patients available PMH, PSH, Social History, medications, allergies, and triage vital signs were reviewed just prior to their medical evaluation.     In the ED patient afebrile and hemodynamically stable saturating well on room air at rest. ESR and CRP elevated. MRI ordered and Orthopedic surgery consulted for concern of possible flexor tenosynovitis. Patient started on vanc and zosyn and admitted to the care of medicine for further evaluation and management.     Overview/Hospital Course:  Max Mcgregor is a 54 y.o. male who was admitted to hospital medicine Pt was seen by orthopedic surgery. Ortho recommended f/u with MRI MRI hand/fingers without contrast of right hand: Soft tissue edema middle/distal phalanges 3rd digit and focal well-defined fluid collection at the dorsum of the of the 3rd middle phalanx/DIP level with underlying bone marrow edema of the distal phalanx. While there is no definitive evidence for osteomyelitis of the distal phalanx at this time, the finding of bone marrow edema adjacent to the soft tissue abnormality may represent early  osteomyelitis, with concomitant increased risk of development of osteomyelitis. Pt started and scheduled on abx vancomycin/zosyn. Wound cultures growing gram + cocci, stopping zosyn.     No new subjective & objective note has been filed under this hospital service since the last note was generated.      Assessment/Plan:      * Osteomyelitis of hand  - MRI and Orthopedic surgery consulted for concern of flexor tenosynovitis  - continue vanc and zosyn  - follow up cultures              - growing gram + cocci               - stopping zosyn, continue vanc  - follow up MRI              - soft tissue edema middle/distal phalanges 3rd digit and focal well-defined fluid collection at the dorsum of the of the 3rd middle phalanx/DIP level with underlying bone marrow edema of the distal phalanx. finding of bone marrow edema adjacent to the soft tissue abnormality may represent early osteomyelitis, with concomitant increased risk of development of osteomyelitis.   - pain control  - further management pending clinical course and future study review      Hepatitis C antibody test positive  - follow up Hep C quantitative   - < 12    Cellulitis of right hand  - MRI and Orthopedic surgery consulted for concern of flexor tenosynovitis  - continue vanc and zosyn  - follow up cultures   - growing gram + cocci    - stopping zosyn, continue vanc  - follow up MRI   - soft tissue edema middle/distal phalanges 3rd digit and focal well-defined fluid collection at the dorsum of the of the 3rd middle phalanx/DIP level with underlying bone marrow edema of the distal phalanx   - pain control  - further management pending clinical course and future study review        VTE Risk Mitigation (From admission, onward)           Ordered     heparin (porcine) injection 5,000 Units  Every 8 hours         09/09/24 1905     IP VTE HIGH RISK PATIENT  Once         09/09/24 1905     Place sequential compression device  Until discontinued         09/09/24 1905                     Discharge Planning   CHENCHO: 9/12/2024     Code Status: Full Code   Is the patient medically ready for discharge?:     Reason for patient still in hospital (select all that apply): Patient trending condition, Laboratory test, Treatment, Imaging, and Consult recommendations  Discharge Plan A: Long-term acute care facility (LTAC)                  Agustina Dacosta PA-C  Department of Hospital Medicine   [facility]

## 2024-09-11 NOTE — ASSESSMENT & PLAN NOTE
- MRI and Orthopedic surgery consulted for concern of flexor tenosynovitis  - continue vanc and zosyn  - follow up cultures   - growing gram + cocci    - stopping zosyn, continue vanc  - follow up MRI   - soft tissue edema middle/distal phalanges 3rd digit and focal well-defined fluid collection at the dorsum of the of the 3rd middle phalanx/DIP level with underlying bone marrow edema of the distal phalanx   - pain control  - further management pending clinical course and future study review

## 2024-09-12 PROBLEM — L03.011 PARONYCHIA OF RIGHT MIDDLE FINGER: Status: ACTIVE | Noted: 2024-09-10

## 2024-09-12 LAB
ACID FAST MOD KINY STN SPEC: NORMAL
ALBUMIN SERPL BCP-MCNC: 3.2 G/DL (ref 3.5–5.2)
ALP SERPL-CCNC: 71 U/L (ref 55–135)
ALT SERPL W/O P-5'-P-CCNC: 9 U/L (ref 10–44)
ANION GAP SERPL CALC-SCNC: 9 MMOL/L (ref 8–16)
AST SERPL-CCNC: 10 U/L (ref 10–40)
BASOPHILS # BLD AUTO: 0.02 K/UL (ref 0–0.2)
BASOPHILS NFR BLD: 0.3 % (ref 0–1.9)
BILIRUB SERPL-MCNC: 0.2 MG/DL (ref 0.1–1)
BUN SERPL-MCNC: 18 MG/DL (ref 6–20)
CALCIUM SERPL-MCNC: 10.2 MG/DL (ref 8.7–10.5)
CHLORIDE SERPL-SCNC: 105 MMOL/L (ref 95–110)
CO2 SERPL-SCNC: 20 MMOL/L (ref 23–29)
CREAT SERPL-MCNC: 0.8 MG/DL (ref 0.5–1.4)
CRP SERPL-MCNC: 39.5 MG/L (ref 0–8.2)
DIFFERENTIAL METHOD BLD: ABNORMAL
EOSINOPHIL # BLD AUTO: 0.1 K/UL (ref 0–0.5)
EOSINOPHIL NFR BLD: 0.8 % (ref 0–8)
ERYTHROCYTE [DISTWIDTH] IN BLOOD BY AUTOMATED COUNT: 14.5 % (ref 11.5–14.5)
EST. GFR  (NO RACE VARIABLE): >60 ML/MIN/1.73 M^2
GLUCOSE SERPL-MCNC: 127 MG/DL (ref 70–110)
HCT VFR BLD AUTO: 41.6 % (ref 40–54)
HCV RNA SERPL NAA+PROBE-LOG IU: 1.29 LOGIU/ML
HCV RNA SERPL QL NAA+PROBE: DETECTED
HCV RNA SPEC NAA+PROBE-ACNC: 20 IU/ML
HGB BLD-MCNC: 13.6 G/DL (ref 14–18)
IMM GRANULOCYTES # BLD AUTO: 0.04 K/UL (ref 0–0.04)
IMM GRANULOCYTES NFR BLD AUTO: 0.6 % (ref 0–0.5)
LYMPHOCYTES # BLD AUTO: 2.5 K/UL (ref 1–4.8)
LYMPHOCYTES NFR BLD: 34.4 % (ref 18–48)
MCH RBC QN AUTO: 27.5 PG (ref 27–31)
MCHC RBC AUTO-ENTMCNC: 32.7 G/DL (ref 32–36)
MCV RBC AUTO: 84 FL (ref 82–98)
MONOCYTES # BLD AUTO: 0.6 K/UL (ref 0.3–1)
MONOCYTES NFR BLD: 7.8 % (ref 4–15)
MYCOBACTERIUM SPEC QL CULT: NORMAL
NEUTROPHILS # BLD AUTO: 4 K/UL (ref 1.8–7.7)
NEUTROPHILS NFR BLD: 56.1 % (ref 38–73)
NRBC BLD-RTO: 0 /100 WBC
PLATELET # BLD AUTO: 390 K/UL (ref 150–450)
PMV BLD AUTO: 9.8 FL (ref 9.2–12.9)
POTASSIUM SERPL-SCNC: 4.3 MMOL/L (ref 3.5–5.1)
PROT SERPL-MCNC: 8 G/DL (ref 6–8.4)
RBC # BLD AUTO: 4.95 M/UL (ref 4.6–6.2)
SODIUM SERPL-SCNC: 134 MMOL/L (ref 136–145)
VANCOMYCIN TROUGH SERPL-MCNC: 9.6 UG/ML (ref 10–22)
WBC # BLD AUTO: 7.16 K/UL (ref 3.9–12.7)

## 2024-09-12 PROCEDURE — 63600175 PHARM REV CODE 636 W HCPCS: Performed by: HOSPITALIST

## 2024-09-12 PROCEDURE — 25000003 PHARM REV CODE 250

## 2024-09-12 PROCEDURE — 25000003 PHARM REV CODE 250: Performed by: HOSPITALIST

## 2024-09-12 PROCEDURE — 63600175 PHARM REV CODE 636 W HCPCS: Performed by: FAMILY MEDICINE

## 2024-09-12 PROCEDURE — 36415 COLL VENOUS BLD VENIPUNCTURE: CPT | Performed by: HOSPITALIST

## 2024-09-12 PROCEDURE — 80202 ASSAY OF VANCOMYCIN: CPT | Performed by: HOSPITALIST

## 2024-09-12 PROCEDURE — 80053 COMPREHEN METABOLIC PANEL: CPT | Performed by: FAMILY MEDICINE

## 2024-09-12 PROCEDURE — 63600175 PHARM REV CODE 636 W HCPCS

## 2024-09-12 PROCEDURE — 86140 C-REACTIVE PROTEIN: CPT | Performed by: STUDENT IN AN ORGANIZED HEALTH CARE EDUCATION/TRAINING PROGRAM

## 2024-09-12 PROCEDURE — 11000001 HC ACUTE MED/SURG PRIVATE ROOM

## 2024-09-12 PROCEDURE — 99223 1ST HOSP IP/OBS HIGH 75: CPT | Mod: ,,, | Performed by: NURSE PRACTITIONER

## 2024-09-12 PROCEDURE — 85025 COMPLETE CBC W/AUTO DIFF WBC: CPT | Performed by: FAMILY MEDICINE

## 2024-09-12 RX ORDER — KETOROLAC TROMETHAMINE 30 MG/ML
30 INJECTION, SOLUTION INTRAMUSCULAR; INTRAVENOUS EVERY 6 HOURS PRN
Status: DISCONTINUED | OUTPATIENT
Start: 2024-09-12 | End: 2024-09-13 | Stop reason: HOSPADM

## 2024-09-12 RX ORDER — ACETAMINOPHEN 500 MG
1000 TABLET ORAL 3 TIMES DAILY
Status: DISCONTINUED | OUTPATIENT
Start: 2024-09-12 | End: 2024-09-12

## 2024-09-12 RX ORDER — ACETAMINOPHEN 500 MG
1000 TABLET ORAL 3 TIMES DAILY
Status: DISCONTINUED | OUTPATIENT
Start: 2024-09-12 | End: 2024-09-13 | Stop reason: HOSPADM

## 2024-09-12 RX ORDER — METHOCARBAMOL 500 MG/1
500 TABLET, FILM COATED ORAL 4 TIMES DAILY
Status: DISCONTINUED | OUTPATIENT
Start: 2024-09-12 | End: 2024-09-13 | Stop reason: HOSPADM

## 2024-09-12 RX ADMIN — KETOROLAC TROMETHAMINE 30 MG: 30 INJECTION, SOLUTION INTRAMUSCULAR; INTRAVENOUS at 10:09

## 2024-09-12 RX ADMIN — HEPARIN SODIUM 5000 UNITS: 5000 INJECTION INTRAVENOUS; SUBCUTANEOUS at 09:09

## 2024-09-12 RX ADMIN — HYDROMORPHONE HYDROCHLORIDE 0.2 MG: 0.5 INJECTION, SOLUTION INTRAMUSCULAR; INTRAVENOUS; SUBCUTANEOUS at 07:09

## 2024-09-12 RX ADMIN — VANCOMYCIN HYDROCHLORIDE 1000 MG: 1 INJECTION, POWDER, LYOPHILIZED, FOR SOLUTION INTRAVENOUS at 06:09

## 2024-09-12 RX ADMIN — OXYCODONE HYDROCHLORIDE 10 MG: 10 TABLET ORAL at 09:09

## 2024-09-12 RX ADMIN — METHOCARBAMOL 500 MG: 500 TABLET ORAL at 09:09

## 2024-09-12 RX ADMIN — HEPARIN SODIUM 5000 UNITS: 5000 INJECTION INTRAVENOUS; SUBCUTANEOUS at 01:09

## 2024-09-12 RX ADMIN — METHOCARBAMOL 500 MG: 500 TABLET ORAL at 01:09

## 2024-09-12 RX ADMIN — ACETAMINOPHEN 1000 MG: 500 TABLET ORAL at 10:09

## 2024-09-12 RX ADMIN — OXYCODONE HYDROCHLORIDE 10 MG: 10 TABLET ORAL at 05:09

## 2024-09-12 RX ADMIN — VANCOMYCIN HYDROCHLORIDE 1000 MG: 1 INJECTION, POWDER, LYOPHILIZED, FOR SOLUTION INTRAVENOUS at 05:09

## 2024-09-12 RX ADMIN — HEPARIN SODIUM 5000 UNITS: 5000 INJECTION INTRAVENOUS; SUBCUTANEOUS at 05:09

## 2024-09-12 RX ADMIN — METHOCARBAMOL 500 MG: 500 TABLET ORAL at 06:09

## 2024-09-12 RX ADMIN — ACETAMINOPHEN 1000 MG: 500 TABLET ORAL at 02:09

## 2024-09-12 RX ADMIN — OXYCODONE HYDROCHLORIDE 10 MG: 10 TABLET ORAL at 06:09

## 2024-09-12 RX ADMIN — HYDROMORPHONE HYDROCHLORIDE 0.2 MG: 0.5 INJECTION, SOLUTION INTRAMUSCULAR; INTRAVENOUS; SUBCUTANEOUS at 12:09

## 2024-09-12 RX ADMIN — ACETAMINOPHEN 1000 MG: 500 TABLET ORAL at 09:09

## 2024-09-12 RX ADMIN — OXYCODONE HYDROCHLORIDE 10 MG: 10 TABLET ORAL at 01:09

## 2024-09-12 NOTE — PLAN OF CARE
No accepting LTAC facilities, CM widened the area ans sent referrals to 3 more facilities. Team notified via Secure Chat.  Will continue to update plan as needed.  Prashant García RN,BSN

## 2024-09-12 NOTE — PROGRESS NOTES
Pharmacokinetic Assessment Follow Up: IV Vancomycin    Vancomycin serum concentration assessment(s):    The trough level was drawn correctly and can be used to guide therapy at this time. The measurement is below the desired definitive target range of 15 to 20 mcg/mL.     Vancomycin Regimen Plan:    Change regimen to Vancomycin 1500 mg IV every 12 hours with next serum trough concentration measured at 0500 prior to 3rd dose on 9/14.    Drug levels (last 3 results):  Recent Labs   Lab Result Units 09/10/24  1642 09/12/24  1724   Vancomycin-Trough ug/mL 7.8* 9.6*       Pharmacy will continue to follow and monitor vancomycin.    Please contact pharmacy at extension [phone] for questions regarding this assessment.    Thank you for the consult,   Miguel A Smith       Patient brief summary:  Max Mcgregor is a 54 y.o. male initiated on antimicrobial therapy with IV Vancomycin for treatment of bone/joint infection - MRSA osteomyelitis of finger.  Goal of 15-20 mg/L.      Drug Allergies:   Review of patient's allergies indicates:  No Known Allergies    Actual Body Weight:   66.3 kg    Renal Function:   Estimated Creatinine Clearance: 97.5 mL/min (based on SCr of 0.8 mg/dL).,     Dialysis Method (if applicable):  N/A    CBC (last 72 hours):  Recent Labs   Lab Result Units 09/10/24  0317 09/11/24  0400 09/12/24  0405   WBC K/uL 9.34 8.21 7.16   Hemoglobin g/dL 12.1* 13.0* 13.6*   Hematocrit % 38.2* 41.3 41.6   Platelets K/uL 329 346 390   Gran % % 60.9 61.6 56.1   Lymph % % 27.8 27.2 34.4   Mono % % 10.0 9.6 7.8   Eosinophil % % 0.9 0.7 0.8   Basophil % % 0.2 0.4 0.3   Differential Method  Automated Automated Automated       Metabolic Panel (last 72 hours):  Recent Labs   Lab Result Units 09/10/24  0317 09/11/24  0400 09/12/24  0405   Sodium mmol/L 135* 133* 134*   Potassium mmol/L 4.2 4.8 4.3   Chloride mmol/L 104 103 105   CO2 mmol/L 22* 22* 20*   Glucose mg/dL 100 96 127*   BUN mg/dL 13 17 18   Creatinine mg/dL 1.1 0.8 0.8    Albumin g/dL 3.0* 3.1* 3.2*   Total Bilirubin mg/dL 0.2 0.4 0.2   Alkaline Phosphatase U/L 81 74 71   AST U/L 11 10 10   ALT U/L 6* 7* 9*       Vancomycin Administrations:  vancomycin given in the last 96 hours                     vancomycin (VANCOCIN) 1,000 mg in D5W 250 mL IVPB (admixture device) (mg) 1,000 mg New Bag 09/12/24 1802     1,000 mg New Bag  0533     1,000 mg New Bag 09/11/24 1826     1,000 mg New Bag 09/10/24 2027    vancomycin 1,250 mg in D5W 250 mL IVPB (admixture device) (mg) 1,250 mg New Bag 09/10/24 0242    vancomycin 1.75 g in 5 % dextrose 500 mL IVPB (mg) 1,750 mg New Bag 09/09/24 1445                    Microbiologic Results:  Microbiology Results (last 7 days)       Procedure Component Value Units Date/Time    AFB Culture & Smear [[id number]] Collected: 09/10/24 1115    Order Status: Completed Specimen: Abscess from Finger, Right Hand Updated: 09/12/24 1610     AFB Culture & Smear Culture in progress     AFB CULTURE STAIN No acid fast bacilli seen.    Blood culture #1 **CANNOT BE ORDERED STAT** [[id number]] Collected: 09/09/24 1342    Order Status: Completed Specimen: Blood from Peripheral, Antecubital, Right Updated: 09/12/24 1422     Blood Culture, Routine No Growth to date      No Growth to date      No Growth to date      No Growth to date    Blood culture #2 **CANNOT BE ORDERED STAT** [[id number]] Collected: 09/09/24 1342    Order Status: Completed Specimen: Blood from Peripheral, Antecubital, Left Updated: 09/12/24 1422     Blood Culture, Routine No Growth to date      No Growth to date      No Growth to date      No Growth to date    Aerobic culture [[id number]]  (Abnormal)  (Susceptibility) Collected: 09/10/24 1115    Order Status: Completed Specimen: Abscess from Finger, Right Hand Updated: 09/12/24 1337     Aerobic Bacterial Culture METHICILLIN RESISTANT STAPHYLOCOCCUS AUREUS  Many      Culture, Anaerobic [[id number]] Collected: 09/10/24 1115    Order Status: Completed Specimen:  Abscess from Finger, Right Hand Updated: 09/11/24 1834     Anaerobic Culture Culture in progress    Gram stain [[id number]] Collected: 09/10/24 1115    Order Status: Completed Specimen: Abscess from Finger, Right Hand Updated: 09/10/24 1406     Gram Stain Result Few WBC's      Moderate Gram positive cocci    Fungus culture [[id number]] Collected: 09/10/24 1115    Order Status: Sent Specimen: Abscess from Finger, Right Hand Updated: 09/10/24 1131

## 2024-09-12 NOTE — PROGRESS NOTES
"[facility]  Orthopedics  Progress Note    Patient Name: Max Mcgregor  MRN: [id number]  Admission Date: 9/9/2024  Hospital Length of Stay: 2 days  Attending Provider: Mirtha Anaya MD  Primary Care Provider: Vida, Primary Doctor    Subjective:     Principal Problem:Paronychia of right middle finger    Principal Orthopedic Problem: As above    Interval History: Patient seen and examined at bedside. NAEON. Afebrile, VSS. Pain is improving. CRP 39.5 from 36.8. Denies fevers, chills or night sweats. Dressing changed today.       Review of patient's allergies indicates:  No Known Allergies    Current Facility-Administered Medications   Medication    acetaminophen tablet 1,000 mg    albuterol inhaler 2 puff    aluminum-magnesium hydroxide-simethicone 200-200-20 mg/5 mL suspension 30 mL    dextrose 10% bolus 125 mL 125 mL    dextrose 10% bolus 250 mL 250 mL    glucagon (human recombinant) injection 1 mg    glucose chewable tablet 16 g    glucose chewable tablet 24 g    heparin (porcine) injection 5,000 Units    HYDROmorphone injection 0.2 mg    melatonin tablet 6 mg    naloxone 0.4 mg/mL injection 0.02 mg    nicotine 21 mg/24 hr 1 patch    ondansetron injection 4 mg    oxyCODONE immediate release tablet 5 mg    oxyCODONE immediate release tablet Tab 10 mg    sodium chloride 0.9% flush 10 mL    vancomycin (VANCOCIN) 1,000 mg in D5W 250 mL IVPB (admixture device)    vancomycin - pharmacy to dose     Objective:     Vital Signs (Most Recent):  Temp: 98 °F (36.7 °C) (09/12/24 0449)  Pulse: 87 (09/12/24 0449)  Resp: 18 (09/12/24 0528)  BP: 103/75 (09/12/24 0449)  SpO2: 97 % (09/12/24 0449) Vital Signs (24h Range):  Temp:  [97.8 °F (36.6 °C)-98.4 °F (36.9 °C)] 98 °F (36.7 °C)  Pulse:  [] 87  Resp:  [17-18] 18  SpO2:  [96 %-100 %] 97 %  BP: (103-130)/(63-75) 103/75     Weight: 66.3 kg (146 lb 2.6 oz)  Height: 5' 7" (170.2 cm)  Body mass index is 22.89 kg/m².    No intake or output data in the 24 hours ending " 09/12/24 0751     Ortho/SPM Exam  A&O x 3  Regular Rate  Non-Labored Respirations    RUE:  Dressing changed today  No tenderness along flexor tendon sheath of 3rd digit  TTP at eponychial fold  Small amount of purulent fluid expressed from beneath eponychial fold  ROM of 3rd digit intact with minimal pain  SILT  Compartments soft  Brisk cap refill       Significant Labs: CBC:   Recent Labs   Lab 09/11/24  0400 09/12/24  0405   WBC 8.21 7.16   HGB 13.0* 13.6*   HCT 41.3 41.6    390     CMP:   Recent Labs   Lab 09/11/24 0400 09/12/24  0405   * 134*   K 4.8 4.3    105   CO2 22* 20*   GLU 96 127*   BUN 17 18   CREATININE 0.8 0.8   CALCIUM 9.5 10.2   PROT 7.8 8.0   ALBUMIN 3.1* 3.2*   BILITOT 0.4 0.2   ALKPHOS 74 71   AST 10 10   ALT 7* 9*   ANIONGAP 8 9     CRP:   Recent Labs   Lab 09/12/24 0405   CRP 39.5*     All pertinent labs within the past 24 hours have been reviewed.    Significant Imaging: I have reviewed and interpreted all pertinent imaging results/findings.  Assessment/Plan:     * Paronychia of right middle finger  Max Mcgregor is a 54 y.o. male with PMH of  IV drug use and tobacco use  presenting to the ED with around 8 days of right 3rd digit pain.  Patient had splinter to the dorsum of the right 3rd digit distal phalanx and noticed progressively worsening pain and swelling.  He self admitted to rehab a few days ago and was prescribed a trial of p.o. antibiotics with no relief.  In ED, afebrile, vital signs stable.  On exam patient has fusiform swelling of the digit and digits held in slight flexion.  He also has pain with passive stretch of the right 3rd digit.  Tenderness is mostly confined to the distal phalanx.  CRP 36.8, ESR 56.  MRI right hand ordered.  Patient received Rocephin, Zosyn and vancomycin in the ED.    S/p Bedside I&D 9/10. CRP 39.5 from 36.8. Clinically improving.    - Repeat CRP 9/13  - Wound check/dressing change 9/13  - Abx: Vanc  - Ok for diet today  - NPO  midnight  - Cultures: Staph aureus            BHUPINDER Santiago MD  Orthopedics  [facility] - Observation 11[facility]

## 2024-09-12 NOTE — ASSESSMENT & PLAN NOTE
Max Mcgregor is a 54 y.o. male with PMH of  IV drug use and tobacco use  presenting to the ED with around 8 days of right 3rd digit pain.  Patient had splinter to the dorsum of the right 3rd digit distal phalanx and noticed progressively worsening pain and swelling.  He self admitted to rehab a few days ago and was prescribed a trial of p.o. antibiotics with no relief.  In ED, afebrile, vital signs stable.  On exam patient has fusiform swelling of the digit and digits held in slight flexion.  He also has pain with passive stretch of the right 3rd digit.  Tenderness is mostly confined to the distal phalanx.  CRP 36.8, ESR 56.  MRI right hand ordered.  Patient received Rocephin, Zosyn and vancomycin in the ED.    S/p Bedside I&D 9/10. CRP 39.5 from 36.8. Clinically improving.    - Repeat CRP 9/13  - Wound check/dressing change 9/13  - Abx: Vanc  - Ok for diet today  - NPO midnight  - Cultures: Staph aureus

## 2024-09-12 NOTE — PLAN OF CARE
Pt accepted by [facility] in [place], [place]. Per rep they are submitting for authorization for a 9/13 or 9/14/24 discharge. Team and pt notified.      09/12/24 1420   Post-Acute Status   Post-Acute Authorization Placement   Post-Acute Placement Status Pending payor review/awaiting authorization (if required)   Discharge Plan   Discharge Plan A Long-term acute care facility (LTAC)   Discharge Plan B Long-term acute care facility (LTAC)     Discharge Plan A and Plan B have been determined by review of patient's clinical status, future medical and therapeutic needs, and coverage/benefits for post-acute care in coordination with multidisciplinary team members.  Prashant García RN,BSN

## 2024-09-12 NOTE — PROGRESS NOTES
[facility] [address] [place] [address] Medicine  Progress Note    Patient Name: Max Mcgregor  MRN: [id number]  Patient Class: IP- Inpatient   Admission Date: 9/9/2024  Length of Stay: 2 days  Attending Physician: Mirtha Anaya MD  Primary Care Provider: Vida, Primary Doctor        Subjective:     Principal Problem:Osteomyelitis of finger of right hand        HPI:  54-year-old male with a history of IV drug use presents with pain to the right 3rd finger. He is currently on rehab voluntarily for reported heroin abuse. Symptoms started over the weekend. Worse today. He denies a history of injecting into the right arm. He says he usually injects into the left. He has a remote history of injury to the right hand from work. Patient denies nausea, vomiting, diarrhea, fever, cough, shortness of breath, chest pain, abdominal pain, or dysuria. He has had cellulitis and abscess  The patients available PMH, PSH, Social History, medications, allergies, and triage vital signs were reviewed just prior to their medical evaluation.     In the ED patient afebrile and hemodynamically stable saturating well on room air at rest. ESR and CRP elevated. MRI ordered and Orthopedic surgery consulted for concern of possible flexor tenosynovitis. Patient started on vanc and zosyn and admitted to the care of medicine for further evaluation and management.     Overview/Hospital Course:  Max Mcgregor is a 54 y.o. male who was admitted to hospital medicine Pt was seen by orthopedic surgery. Ortho recommended f/u with MRI. MRI hand/fingers without contrast of right hand: Soft tissue edema middle/distal phalanges 3rd digit and focal well-defined fluid collection at the dorsum of the of the 3rd middle phalanx/DIP level with underlying bone marrow edema of the distal phalanx. While there is no definitive evidence for osteomyelitis of the distal phalanx at this time, the finding of bone marrow edema adjacent to the soft tissue abnormality may represent early  osteomyelitis, with concomitant increased risk of development of osteomyelitis. Pt started and scheduled on abx vancomycin/zosyn. Wound cultures growing staph aureus, stopping zosyn. ID consulted in anticipation of outpatient IV abx need.     Interval History: Patient seen and assessed at bedside. Afebrile, VSS. CRP 39.5. Ortho saw patient this morning, NPO at midnight. Patient with significant pain to tip of R middle finger. Wound cultures currently with staph aureus. Continue IV vanc.      Review of Systems   Constitutional:  Negative for chills and fever.   Respiratory:  Negative for chest tightness and shortness of breath.    Cardiovascular:  Negative for chest pain and leg swelling.   Gastrointestinal:  Negative for abdominal pain and nausea.   Neurological:  Negative for dizziness and weakness.     Objective:     Vital Signs (Most Recent):  Temp: 97.6 °F (36.4 °C) (09/12/24 0835)  Pulse: 100 (09/12/24 0835)  Resp: 20 (09/12/24 0835)  BP: 113/70 (09/12/24 0835)  SpO2: 95 % (09/12/24 0835) Vital Signs (24h Range):  Temp:  [97.6 °F (36.4 °C)-98.4 °F (36.9 °C)] 97.6 °F (36.4 °C)  Pulse:  [] 100  Resp:  [17-20] 20  SpO2:  [95 %-100 %] 95 %  BP: (103-130)/(63-75) 113/70     Weight: 66.3 kg (146 lb 2.6 oz)  Body mass index is 22.89 kg/m².  No intake or output data in the 24 hours ending 09/12/24 1050      Physical Exam  Vitals and nursing note reviewed.   Constitutional:       Appearance: He is well-developed.   Eyes:      Pupils: Pupils are equal, round, and reactive to light.   Cardiovascular:      Rate and Rhythm: Normal rate and regular rhythm.   Pulmonary:      Effort: Pulmonary effort is normal.      Breath sounds: Normal breath sounds.   Abdominal:      Palpations: Abdomen is soft.      Tenderness: There is no abdominal tenderness.   Musculoskeletal:         General: Swelling and tenderness present.      Comments: R 3rd finger wrapped, clean, dry and intact    Skin:     General: Skin is warm and dry.    Neurological:      Mental Status: He is alert and oriented to person, place, and time.   Psychiatric:         Behavior: Behavior normal.             Significant Labs: All pertinent labs within the past 24 hours have been reviewed.    Significant Imaging: I have reviewed all pertinent imaging results/findings within the past 24 hours.    Assessment/Plan:      * Osteomyelitis of finger of right hand  - MRI and Orthopedic surgery consulted for concern of flexor tenosynovitis  - continue vanc and zosyn  - follow up cultures              - growing staph aureus              - stopping zosyn, continue vanc  - follow up MRI              - soft tissue edema middle/distal phalanges 3rd digit and focal well-defined fluid collection at the dorsum of the of the 3rd middle phalanx/DIP level with underlying bone marrow edema of the distal phalanx. finding of bone marrow edema adjacent to the soft tissue abnormality may represent early osteomyelitis, with concomitant increased risk of development of osteomyelitis.   - pain control  - further management pending clinical course and future study review  - per ortho:   S/p Bedside I&D 9/10. CRP 39.5 from 36.8. Clinically improving.  - Repeat CRP 9/13  - Wound check/dressing change 9/13  - Abx: Vanc  - Ok for diet today  - NPO midnight  - Cultures: Staph aureus  - ID consulted for anticipation of iv abx    Hepatitis C antibody test positive  - follow up Hep C quantitative   - < 12    Cellulitis of right hand  - MRI and Orthopedic surgery consulted for concern of flexor tenosynovitis  - continue vanc and zosyn  - follow up cultures   - growing gram + cocci    - stopping zosyn, continue vanc  - follow up MRI   - soft tissue edema middle/distal phalanges 3rd digit and focal well-defined fluid collection at the dorsum of the of the 3rd middle phalanx/DIP level with underlying bone marrow edema of the distal phalanx   - pain control  - further management pending clinical course and future  study review        VTE Risk Mitigation (From admission, onward)           Ordered     heparin (porcine) injection 5,000 Units  Every 8 hours         09/09/24 1905     IP VTE HIGH RISK PATIENT  Once         09/09/24 1905     Place sequential compression device  Until discontinued         09/09/24 1905                    Discharge Planning   CHENCHO: 9/13/2024     Code Status: Full Code   Is the patient medically ready for discharge?:     Reason for patient still in hospital (select all that apply): Patient trending condition, Laboratory test, Treatment, and Consult recommendations  Discharge Plan A: Long-term acute care facility (LTAC)                  Susanna Aguira PA-C  Department of Hospital Medicine   [facility]

## 2024-09-12 NOTE — SUBJECTIVE & OBJECTIVE
Interval History: Patient seen and assessed at bedside. Afebrile, VSS. CRP 39.5. Ortho saw patient this morning, NPO at midnight. Patient with significant pain to tip of R middle finger. Wound cultures currently with staph aureus. Continue IV vanc.      Review of Systems   Constitutional:  Negative for chills and fever.   Respiratory:  Negative for chest tightness and shortness of breath.    Cardiovascular:  Negative for chest pain and leg swelling.   Gastrointestinal:  Negative for abdominal pain and nausea.   Neurological:  Negative for dizziness and weakness.     Objective:     Vital Signs (Most Recent):  Temp: 97.6 °F (36.4 °C) (09/12/24 0835)  Pulse: 100 (09/12/24 0835)  Resp: 20 (09/12/24 0835)  BP: 113/70 (09/12/24 0835)  SpO2: 95 % (09/12/24 0835) Vital Signs (24h Range):  Temp:  [97.6 °F (36.4 °C)-98.4 °F (36.9 °C)] 97.6 °F (36.4 °C)  Pulse:  [] 100  Resp:  [17-20] 20  SpO2:  [95 %-100 %] 95 %  BP: (103-130)/(63-75) 113/70     Weight: 66.3 kg (146 lb 2.6 oz)  Body mass index is 22.89 kg/m².  No intake or output data in the 24 hours ending 09/12/24 1050      Physical Exam  Vitals and nursing note reviewed.   Constitutional:       Appearance: He is well-developed.   Eyes:      Pupils: Pupils are equal, round, and reactive to light.   Cardiovascular:      Rate and Rhythm: Normal rate and regular rhythm.   Pulmonary:      Effort: Pulmonary effort is normal.      Breath sounds: Normal breath sounds.   Abdominal:      Palpations: Abdomen is soft.      Tenderness: There is no abdominal tenderness.   Musculoskeletal:         General: Swelling and tenderness present.      Comments: R 3rd finger wrapped, clean, dry and intact    Skin:     General: Skin is warm and dry.   Neurological:      Mental Status: He is alert and oriented to person, place, and time.   Psychiatric:         Behavior: Behavior normal.             Significant Labs: All pertinent labs within the past 24 hours have been  reviewed.    Significant Imaging: I have reviewed all pertinent imaging results/findings within the past 24 hours.

## 2024-09-12 NOTE — SUBJECTIVE & OBJECTIVE
"Principal Problem:Paronychia of right middle finger    Principal Orthopedic Problem: As above    Interval History: Patient seen and examined at bedside. NAEON. Afebrile, VSS. Pain is improving. CRP 39.5 from 36.8. Denies fevers, chills or night sweats. Dressing changed today.       Review of patient's allergies indicates:  No Known Allergies    Current Facility-Administered Medications   Medication    acetaminophen tablet 1,000 mg    albuterol inhaler 2 puff    aluminum-magnesium hydroxide-simethicone 200-200-20 mg/5 mL suspension 30 mL    dextrose 10% bolus 125 mL 125 mL    dextrose 10% bolus 250 mL 250 mL    glucagon (human recombinant) injection 1 mg    glucose chewable tablet 16 g    glucose chewable tablet 24 g    heparin (porcine) injection 5,000 Units    HYDROmorphone injection 0.2 mg    melatonin tablet 6 mg    naloxone 0.4 mg/mL injection 0.02 mg    nicotine 21 mg/24 hr 1 patch    ondansetron injection 4 mg    oxyCODONE immediate release tablet 5 mg    oxyCODONE immediate release tablet Tab 10 mg    sodium chloride 0.9% flush 10 mL    vancomycin (VANCOCIN) 1,000 mg in D5W 250 mL IVPB (admixture device)    vancomycin - pharmacy to dose     Objective:     Vital Signs (Most Recent):  Temp: 98 °F (36.7 °C) (09/12/24 0449)  Pulse: 87 (09/12/24 0449)  Resp: 18 (09/12/24 0528)  BP: 103/75 (09/12/24 0449)  SpO2: 97 % (09/12/24 0449) Vital Signs (24h Range):  Temp:  [97.8 °F (36.6 °C)-98.4 °F (36.9 °C)] 98 °F (36.7 °C)  Pulse:  [] 87  Resp:  [17-18] 18  SpO2:  [96 %-100 %] 97 %  BP: (103-130)/(63-75) 103/75     Weight: 66.3 kg (146 lb 2.6 oz)  Height: 5' 7" (170.2 cm)  Body mass index is 22.89 kg/m².    No intake or output data in the 24 hours ending 09/12/24 0751     Ortho/SPM Exam  A&O x 3  Regular Rate  Non-Labored Respirations    RUE:  Dressing changed today  No tenderness along flexor tendon sheath of 3rd digit  TTP at eponychial fold  Small amount of purulent fluid expressed from beneath eponychial " fold  ROM of 3rd digit intact with minimal pain  SILT  Compartments soft  Brisk cap refill       Significant Labs: CBC:   Recent Labs   Lab 09/11/24  0400 09/12/24  0405   WBC 8.21 7.16   HGB 13.0* 13.6*   HCT 41.3 41.6    390     CMP:   Recent Labs   Lab 09/11/24 0400 09/12/24 0405   * 134*   K 4.8 4.3    105   CO2 22* 20*   GLU 96 127*   BUN 17 18   CREATININE 0.8 0.8   CALCIUM 9.5 10.2   PROT 7.8 8.0   ALBUMIN 3.1* 3.2*   BILITOT 0.4 0.2   ALKPHOS 74 71   AST 10 10   ALT 7* 9*   ANIONGAP 8 9     CRP:   Recent Labs   Lab 09/12/24 0405   CRP 39.5*     All pertinent labs within the past 24 hours have been reviewed.    Significant Imaging: I have reviewed and interpreted all pertinent imaging results/findings.

## 2024-09-13 VITALS
SYSTOLIC BLOOD PRESSURE: 116 MMHG | RESPIRATION RATE: 18 BRPM | HEART RATE: 105 BPM | BODY MASS INDEX: 22.94 KG/M2 | HEIGHT: 67 IN | TEMPERATURE: 98 F | OXYGEN SATURATION: 97 % | DIASTOLIC BLOOD PRESSURE: 76 MMHG | WEIGHT: 146.19 LBS

## 2024-09-13 LAB
ALBUMIN SERPL BCP-MCNC: 3 G/DL (ref 3.5–5.2)
ALP SERPL-CCNC: 76 U/L (ref 55–135)
ALT SERPL W/O P-5'-P-CCNC: 8 U/L (ref 10–44)
ANION GAP SERPL CALC-SCNC: 7 MMOL/L (ref 8–16)
AST SERPL-CCNC: 10 U/L (ref 10–40)
BACTERIA SPEC AEROBE CULT: ABNORMAL
BASOPHILS # BLD AUTO: 0.02 K/UL (ref 0–0.2)
BASOPHILS NFR BLD: 0.3 % (ref 0–1.9)
BILIRUB SERPL-MCNC: 0.1 MG/DL (ref 0.1–1)
BUN SERPL-MCNC: 30 MG/DL (ref 6–20)
CALCIUM SERPL-MCNC: 9.8 MG/DL (ref 8.7–10.5)
CHLORIDE SERPL-SCNC: 106 MMOL/L (ref 95–110)
CK SERPL-CCNC: 25 U/L (ref 20–200)
CO2 SERPL-SCNC: 23 MMOL/L (ref 23–29)
CREAT SERPL-MCNC: 1 MG/DL (ref 0.5–1.4)
CRP SERPL-MCNC: 16.7 MG/L (ref 0–8.2)
DIFFERENTIAL METHOD BLD: ABNORMAL
EOSINOPHIL # BLD AUTO: 0 K/UL (ref 0–0.5)
EOSINOPHIL NFR BLD: 0.6 % (ref 0–8)
ERYTHROCYTE [DISTWIDTH] IN BLOOD BY AUTOMATED COUNT: 14.3 % (ref 11.5–14.5)
EST. GFR  (NO RACE VARIABLE): >60 ML/MIN/1.73 M^2
GLUCOSE SERPL-MCNC: 86 MG/DL (ref 70–110)
HCT VFR BLD AUTO: 42 % (ref 40–54)
HGB BLD-MCNC: 13.5 G/DL (ref 14–18)
IMM GRANULOCYTES # BLD AUTO: 0.03 K/UL (ref 0–0.04)
IMM GRANULOCYTES NFR BLD AUTO: 0.5 % (ref 0–0.5)
LYMPHOCYTES # BLD AUTO: 2.2 K/UL (ref 1–4.8)
LYMPHOCYTES NFR BLD: 33.7 % (ref 18–48)
MAGNESIUM SERPL-MCNC: 2.3 MG/DL (ref 1.6–2.6)
MCH RBC QN AUTO: 27.4 PG (ref 27–31)
MCHC RBC AUTO-ENTMCNC: 32.1 G/DL (ref 32–36)
MCV RBC AUTO: 85 FL (ref 82–98)
MONOCYTES # BLD AUTO: 0.7 K/UL (ref 0.3–1)
MONOCYTES NFR BLD: 11.5 % (ref 4–15)
NEUTROPHILS # BLD AUTO: 3.4 K/UL (ref 1.8–7.7)
NEUTROPHILS NFR BLD: 53.4 % (ref 38–73)
NRBC BLD-RTO: 0 /100 WBC
PHOSPHATE SERPL-MCNC: 3.2 MG/DL (ref 2.7–4.5)
PLATELET # BLD AUTO: 384 K/UL (ref 150–450)
PMV BLD AUTO: 10.5 FL (ref 9.2–12.9)
POTASSIUM SERPL-SCNC: 4.7 MMOL/L (ref 3.5–5.1)
PROT SERPL-MCNC: 7.7 G/DL (ref 6–8.4)
RBC # BLD AUTO: 4.93 M/UL (ref 4.6–6.2)
SODIUM SERPL-SCNC: 136 MMOL/L (ref 136–145)
WBC # BLD AUTO: 6.41 K/UL (ref 3.9–12.7)

## 2024-09-13 PROCEDURE — 86140 C-REACTIVE PROTEIN: CPT

## 2024-09-13 PROCEDURE — 83735 ASSAY OF MAGNESIUM: CPT

## 2024-09-13 PROCEDURE — 25000003 PHARM REV CODE 250: Performed by: HOSPITALIST

## 2024-09-13 PROCEDURE — 76937 US GUIDE VASCULAR ACCESS: CPT

## 2024-09-13 PROCEDURE — 82550 ASSAY OF CK (CPK): CPT

## 2024-09-13 PROCEDURE — 63600175 PHARM REV CODE 636 W HCPCS: Performed by: NURSE PRACTITIONER

## 2024-09-13 PROCEDURE — 36415 COLL VENOUS BLD VENIPUNCTURE: CPT

## 2024-09-13 PROCEDURE — 63600175 PHARM REV CODE 636 W HCPCS: Performed by: FAMILY MEDICINE

## 2024-09-13 PROCEDURE — 05HC33Z INSERTION OF INFUSION DEVICE INTO LEFT BASILIC VEIN, PERCUTANEOUS APPROACH: ICD-10-PCS | Performed by: HOSPITALIST

## 2024-09-13 PROCEDURE — 80053 COMPREHEN METABOLIC PANEL: CPT

## 2024-09-13 PROCEDURE — C1751 CATH, INF, PER/CENT/MIDLINE: HCPCS

## 2024-09-13 PROCEDURE — 25000003 PHARM REV CODE 250

## 2024-09-13 PROCEDURE — 25000003 PHARM REV CODE 250: Performed by: NURSE PRACTITIONER

## 2024-09-13 PROCEDURE — 84100 ASSAY OF PHOSPHORUS: CPT

## 2024-09-13 PROCEDURE — 85025 COMPLETE CBC W/AUTO DIFF WBC: CPT

## 2024-09-13 PROCEDURE — 36573 INSJ PICC RS&I 5 YR+: CPT

## 2024-09-13 PROCEDURE — 63600175 PHARM REV CODE 636 W HCPCS: Performed by: HOSPITALIST

## 2024-09-13 PROCEDURE — 94761 N-INVAS EAR/PLS OXIMETRY MLT: CPT

## 2024-09-13 PROCEDURE — A4216 STERILE WATER/SALINE, 10 ML: HCPCS | Performed by: HOSPITALIST

## 2024-09-13 PROCEDURE — 99232 SBSQ HOSP IP/OBS MODERATE 35: CPT | Mod: ,,, | Performed by: NURSE PRACTITIONER

## 2024-09-13 RX ORDER — OXYCODONE HYDROCHLORIDE 10 MG/1
10 TABLET ORAL EVERY 6 HOURS PRN
Status: DISCONTINUED | OUTPATIENT
Start: 2024-09-13 | End: 2024-09-13 | Stop reason: HOSPADM

## 2024-09-13 RX ORDER — IBUPROFEN 400 MG/1
400 TABLET ORAL EVERY 4 HOURS PRN
Start: 2024-09-13

## 2024-09-13 RX ORDER — SODIUM CHLORIDE 0.9 % (FLUSH) 0.9 %
10 SYRINGE (ML) INJECTION
Status: DISCONTINUED | OUTPATIENT
Start: 2024-09-13 | End: 2024-09-13 | Stop reason: HOSPADM

## 2024-09-13 RX ORDER — OXYCODONE HYDROCHLORIDE 10 MG/1
10 TABLET ORAL EVERY 4 HOURS PRN
Start: 2024-09-13

## 2024-09-13 RX ORDER — METHOCARBAMOL 500 MG/1
500 TABLET, FILM COATED ORAL 4 TIMES DAILY
Start: 2024-09-13 | End: 2024-09-23

## 2024-09-13 RX ORDER — SODIUM CHLORIDE 0.9 % (FLUSH) 0.9 %
10 SYRINGE (ML) INJECTION EVERY 6 HOURS
Status: DISCONTINUED | OUTPATIENT
Start: 2024-09-13 | End: 2024-09-13 | Stop reason: HOSPADM

## 2024-09-13 RX ORDER — IBUPROFEN 200 MG
1 TABLET ORAL DAILY
Start: 2024-09-14

## 2024-09-13 RX ORDER — ACETAMINOPHEN 500 MG
1000 TABLET ORAL 3 TIMES DAILY
Start: 2024-09-13

## 2024-09-13 RX ADMIN — OXYCODONE HYDROCHLORIDE 10 MG: 10 TABLET ORAL at 06:09

## 2024-09-13 RX ADMIN — HEPARIN SODIUM 5000 UNITS: 5000 INJECTION INTRAVENOUS; SUBCUTANEOUS at 06:09

## 2024-09-13 RX ADMIN — OXYCODONE HYDROCHLORIDE 10 MG: 10 TABLET ORAL at 01:09

## 2024-09-13 RX ADMIN — METHOCARBAMOL 500 MG: 500 TABLET ORAL at 09:09

## 2024-09-13 RX ADMIN — METHOCARBAMOL 500 MG: 500 TABLET ORAL at 06:09

## 2024-09-13 RX ADMIN — METHOCARBAMOL 500 MG: 500 TABLET ORAL at 01:09

## 2024-09-13 RX ADMIN — DAPTOMYCIN 530 MG: 350 INJECTION, POWDER, LYOPHILIZED, FOR SOLUTION INTRAVENOUS at 06:09

## 2024-09-13 RX ADMIN — VANCOMYCIN HYDROCHLORIDE 1500 MG: 1.5 INJECTION, POWDER, LYOPHILIZED, FOR SOLUTION INTRAVENOUS at 06:09

## 2024-09-13 RX ADMIN — ACETAMINOPHEN 1000 MG: 500 TABLET ORAL at 03:09

## 2024-09-13 RX ADMIN — HEPARIN SODIUM 5000 UNITS: 5000 INJECTION INTRAVENOUS; SUBCUTANEOUS at 01:09

## 2024-09-13 RX ADMIN — Medication 10 ML: at 06:09

## 2024-09-13 NOTE — PLAN OF CARE
Outpatient Antibiotic Therapy Plan:        1) Infection: Osteomyelitis right 3rd finger    2) Discharge Antibiotics:    Intravenous antibiotics:  Daptomycin 8 mg/kg IV q 24 hours       3) Therapy Duration:  6 weeks    Estimated end date of IV antibiotics: 10/21/2024    4) Outpatient Weekly Labs:    Order the following labs to be drawn on Mondays:   CBC  CMP   CRP  CPK     5) Fax Lab Results to Infectious Diseases Provider: Terrence Melchor NP    [facility] Fax Number: [phone]    6) Outpatient Infectious Diseases Follow-up    Follow-up appointment will be arranged by the ID clinic and will be found in the patient's appointments tab.  If patient goes to LTAC, please consult ID there for a follow up  Prior to discharge, please ensure the patient's follow-up has been scheduled.    If there is still no follow-up scheduled prior to discharge, please send an [employer] message to [facility] Clinical Pool or Call Infectious Diseases Dept.

## 2024-09-13 NOTE — NURSING
Report attempted x3  to [phone] and [phone]. No answer. VM left on [phone] with [employer] number.

## 2024-09-13 NOTE — SUBJECTIVE & OBJECTIVE
"Past Medical History:   Diagnosis Date    Cellulitis and abscess of hand 03/09/2020    Left dorsal side, I&D done 3/9/20    Cigarette smoker     History of drug use     cocaine & IV heroin       Past Surgical History:   Procedure Laterality Date    INCISION AND DRAINAGE OF HAND Left 3/10/2020    Procedure: INCISION AND DRAINAGE, WRIST;  Surgeon: Asa Maciel MD;  Location: [facility];  Service: Orthopedics;  Laterality: Left;    NO PAST SURGERIES  03/09/2020       Review of patient's allergies indicates:  No Known Allergies    Medications:  Medications Prior to Admission   Medication Sig    hydrocortisone 2.5 % cream Apply topically 4 (four) times daily. Use as needed until the rash is gone    ketoconazole (NIZORAL) 2 % cream Apply topically 2 (two) times daily.     Antibiotics (From admission, onward)      Start     Stop Route Frequency Ordered    09/13/24 0530  vancomycin 1,500 mg in D5W 250 mL IVPB (admixture device)         -- IV Every 12 hours (non-standard times) 09/12/24 1825    09/09/24 1948  vancomycin - pharmacy to dose  (vancomycin IVPB (PEDS and ADULTS))        Placed in "And" Linked Group    -- IV pharmacy to manage frequency 09/09/24 1900          Antifungals (From admission, onward)      None          Antivirals (From admission, onward)      None             Immunization History   Administered Date(s) Administered    Tdap 03/09/2020       Family History    None       Social History     Socioeconomic History    Marital status: Single   Tobacco Use    Smoking status: Every Day     Current packs/day: 0.50     Types: Cigarettes    Smokeless tobacco: Never   Substance and Sexual Activity    Alcohol use: Yes     Comment: 6 pack of beer per week    Drug use: Not Currently     Types: Heroin, "Crack" cocaine     Comment: 3/9/20:  IV heroin     Sexual activity: Yes     Partners: Female   Social History Narrative    ** Merged History Encounter **          Social Determinants of Health     Food Insecurity: " Food Insecurity Present (7/11/2024)    Received from [facility]    Hunger Vital Sign     Worried About Running Out of Food in the Last Year: Sometimes true     Ran Out of Food in the Last Year: Sometimes true   Transportation Needs: Patient Unable To Answer (7/11/2024)    Received from [facility]    PRAPARE - Transportation     Lack of Transportation (Medical): Patient unable to answer     Lack of Transportation (Non-Medical): Patient unable to answer     Review of Systems   Musculoskeletal:  Positive for arthralgias.   Skin:  Positive for color change.   All other systems reviewed and are negative.    Objective:     Vital Signs (Most Recent):  Temp: 97.7 °F (36.5 °C) (09/12/24 1637)  Pulse: 98 (09/12/24 1637)  Resp: 18 (09/12/24 1800)  BP: 106/63 (09/12/24 1637)  SpO2: 97 % (09/12/24 1637) Vital Signs (24h Range):  Temp:  [97.6 °F (36.4 °C)-98 °F (36.7 °C)] 97.7 °F (36.5 °C)  Pulse:  [] 98  Resp:  [18-20] 18  SpO2:  [95 %-98 %] 97 %  BP: (103-123)/(63-75) 106/63     Weight: 66.3 kg (146 lb 2.6 oz)  Body mass index is 22.89 kg/m².    Estimated Creatinine Clearance: 97.5 mL/min (based on SCr of 0.8 mg/dL).     Physical Exam  Constitutional:       General: He is not in acute distress.     Appearance: He is not ill-appearing, toxic-appearing or diaphoretic.   HENT:      Mouth/Throat:      Mouth: Mucous membranes are moist.   Eyes:      General: No scleral icterus.     Conjunctiva/sclera: Conjunctivae normal.   Cardiovascular:      Rate and Rhythm: Normal rate and regular rhythm.   Pulmonary:      Effort: Pulmonary effort is normal. No respiratory distress.   Musculoskeletal:      Cervical back: Normal range of motion.      Right lower leg: No edema.      Left lower leg: No edema.      Comments: Right 3rd finger wrapped.  C/d/I.  No proximal erythema   Skin:     General: Skin is warm and dry.   Neurological:      Mental Status: He is alert.                Significant Labs: Blood Culture:   Recent Labs   Lab  09/09/24  1342   LABBLOO No Growth to date  No Growth to date  No Growth to date  No Growth to date  No Growth to date  No Growth to date  No Growth to date  No Growth to date     CBC:   Recent Labs   Lab 09/11/24  0400 09/12/24  0405   WBC 8.21 7.16   HGB 13.0* 13.6*   HCT 41.3 41.6    390     CMP:   Recent Labs   Lab 09/11/24  0400 09/12/24  0405   * 134*   K 4.8 4.3    105   CO2 22* 20*   GLU 96 127*   BUN 17 18   CREATININE 0.8 0.8   CALCIUM 9.5 10.2   PROT 7.8 8.0   ALBUMIN 3.1* 3.2*   BILITOT 0.4 0.2   ALKPHOS 74 71   AST 10 10   ALT 7* 9*   ANIONGAP 8 9     Microbiology Results (last 7 days)       Procedure Component Value Units Date/Time    AFB Culture & Smear [[id number]] Collected: 09/10/24 1115    Order Status: Completed Specimen: Abscess from Finger, Right Hand Updated: 09/12/24 1610     AFB Culture & Smear Culture in progress     AFB CULTURE STAIN No acid fast bacilli seen.    Blood culture #1 **CANNOT BE ORDERED STAT** [[id number]] Collected: 09/09/24 1342    Order Status: Completed Specimen: Blood from Peripheral, Antecubital, Right Updated: 09/12/24 1422     Blood Culture, Routine No Growth to date      No Growth to date      No Growth to date      No Growth to date    Blood culture #2 **CANNOT BE ORDERED STAT** [[id number]] Collected: 09/09/24 1342    Order Status: Completed Specimen: Blood from Peripheral, Antecubital, Left Updated: 09/12/24 1422     Blood Culture, Routine No Growth to date      No Growth to date      No Growth to date      No Growth to date    Aerobic culture [[id number]]  (Abnormal)  (Susceptibility) Collected: 09/10/24 1115    Order Status: Completed Specimen: Abscess from Finger, Right Hand Updated: 09/12/24 1337     Aerobic Bacterial Culture METHICILLIN RESISTANT STAPHYLOCOCCUS AUREUS  Many      Culture, Anaerobic [[id number]] Collected: 09/10/24 1115    Order Status: Completed Specimen: Abscess from Finger, Right Hand Updated: 09/11/24 1834      Anaerobic Culture Culture in progress    Gram stain [[id number]] Collected: 09/10/24 1115    Order Status: Completed Specimen: Abscess from Finger, Right Hand Updated: 09/10/24 1406     Gram Stain Result Few WBC's      Moderate Gram positive cocci    Fungus culture [[id number]] Collected: 09/10/24 1115    Order Status: Sent Specimen: Abscess from Finger, Right Hand Updated: 09/10/24 1131          Wound Culture:   Recent Labs   Lab 09/10/24  1115   LABAERO METHICILLIN RESISTANT STAPHYLOCOCCUS AUREUS  Many  *       Significant Imaging: I have reviewed all pertinent imaging results/findings within the past 24 hours.

## 2024-09-13 NOTE — PLAN OF CARE
[facility]    FACILITY TRANSFER ORDERS      Patient Name: Max Mcgregor  YOB: 1970    PCP: Vida, Primary Doctor   PCP Address: None  PCP Phone Number: None  PCP Fax: None    Encounter Date: 09/13/2024    Admit to: LTAC    Vital Signs:  Routine    Diagnoses:   Active Hospital Problems    Diagnosis  POA    *Osteomyelitis of finger of right hand [M86.9]  Yes    Cellulitis of right hand [L03.113]  Yes    Hepatitis C antibody test positive [R76.8]  Yes    Paronychia of right middle finger [L03.011]  Yes      Resolved Hospital Problems   No resolved problems to display.       Allergies:Review of patient's allergies indicates:  No Known Allergies    Diet: regular diet    Activities: Activity as tolerated    Goals of Care Treatment Preferences:  Code Status: Full Code      Outpatient Antibiotic Therapy Plan:           1) Infection: Osteomyelitis right 3rd finger     2) Discharge Antibiotics:     Intravenous antibiotics:  Daptomycin 8 mg/kg IV q 24 hours         3) Therapy Duration:  6 weeks     Estimated end date of IV antibiotics: 10/21/2024     4) Outpatient Weekly Labs:     Order the following labs to be drawn on Mondays:   CBC  CMP   CRP  CPK      5) Fax Lab Results to Infectious Diseases Provider: Terrence Melchor NP     [facility] Fax Number: [phone]     6) Outpatient Infectious Diseases Follow-up     Follow-up appointment will be arranged by the ID clinic and will be found in the patient's appointments tab.  If patient goes to LTAC, please consult ID there for a follow up  Prior to discharge, please ensure the patient's follow-up has been scheduled.    If there is still no follow-up scheduled prior to discharge, please send an [employer] message to [facility] Clinical [place] or Call Infectious Diseases Dept.    Medications: Review discharge medications with patient and family and provide education.         Medication List        START taking these medications      acetaminophen 500 MG tablet  Commonly  known as: TYLENOL  Take 2 tablets (1,000 mg total) by mouth 3 (three) times daily.     ibuprofen 400 MG tablet  Commonly known as: ADVIL,MOTRIN  Take 1 tablet (400 mg total) by mouth every 4 (four) hours as needed (pain).     methocarbamoL 500 MG Tab  Commonly known as: ROBAXIN  Take 1 tablet (500 mg total) by mouth 4 (four) times daily.   May increase up to 1500 mg QID     nicotine 21 mg/24 hr  Commonly known as: NICODERM CQ  Place 1 patch onto the skin once daily.  Start taking on: September 14, 2024     oxyCODONE 10 mg Tab immediate release tablet  Commonly known as: ROXICODONE  Take 1 tablet (10 mg total) by mouth every 4 (four) hours as needed (severe pain only).  End Date 9/20/24              STOP taking these medications      hydrocortisone 2.5 % cream     ketoconazole 2 % cream  Commonly known as: NIZORAL                Immunizations Administered as of 9/13/2024       No immunizations on file.          _________________________________  Mirtha Anaya MD  09/13/2024

## 2024-09-13 NOTE — PLAN OF CARE
CM was notified of placement. Patient was accepted to [facility] on 9/13/24. Report can be called to [phone] ask for Miranda.   CM arranged ambulatory van transport via [facility]. Requested  time is 1800.  Requested  time does not guarantee arrival time.  If transport does not arrive by 1900 please contact assigned SW or on-call for assistance.     09/13/24 7495   Post-Acute Status   Post-Acute Authorization Placement   Post-Acute Placement Status Set-up Complete/Auth obtained   Hospital Resources/Appts/Education Provided Provided patient/caregiver with written discharge plan information;Provided education on problems/symptoms using teachback;Appointments scheduled and added to AVS   Discharge Plan   Discharge Plan A Long-term acute care facility (LTAC)   Discharge Plan B Long-term acute care facility (LTAC)         Prashant García, RN,BSN

## 2024-09-13 NOTE — PROCEDURES
"Max Mcgregor is a 54 y.o. male patient.    Temp: 98.5 °F (36.9 °C) (09/13/24 1118)  Pulse: 90 (09/13/24 1118)  Resp: 18 (09/13/24 1349)  BP: 120/76 (09/13/24 1118)  SpO2: 98 % (09/13/24 1540)  Weight: 66.3 kg (146 lb 2.6 oz) (09/10/24 0212)  Height: 5' 7" (170.2 cm) (09/09/24 1135)    PICC  Date/Time: 9/13/2024 4:00 PM  Performed by: Nirav Patel RN  Assisting provider: Zane Quezada RN  Consent Done: Yes  Time out: Immediately prior to procedure a time out was called to verify the correct patient, procedure, equipment, support staff and site/side marked as required  Indications: med administration  Anesthesia: local infiltration  Local anesthetic: lidocaine 1% without epinephrine  Anesthetic Total (mL): 4  Description of findings: PICC  Preparation: skin prepped with ChloraPrep  Skin prep agent dried: skin prep agent completely dried prior to procedure  Sterile barriers: all five maximum sterile barriers used - cap, mask, sterile gown, sterile gloves, and large sterile sheet  Hand hygiene: hand hygiene performed prior to central venous catheter insertion  Location details: left basilic  Catheter type: double lumen  Catheter size: 5 Fr  Catheter Length: 43cm    Ultrasound guidance: yes  Vessel Caliber: medium and patent, compressibility normal  Vascular Doppler: not done  Needle advanced into vessel with real time Ultrasound guidance.  Guidewire confirmed in vessel.  Image recorded and saved.  Sterile sheath used.  no esophageal manometryNumber of attempts: 1  Post-procedure: blood return through all ports, chlorhexidine patch and sterile dressing applied  Technical procedures used: 3CG  Specimens: No  Implants: No  Assessment: placement verified by x-ray  Complications: none  Other complications: 1CM EXTERNAL LENGTH        Name S JL CARBAJAL  9/13/2024    "

## 2024-09-13 NOTE — ASSESSMENT & PLAN NOTE
HCV antibody + and Quantitative RNA +.  Will need outpatient referral to Hepatology for HCV management

## 2024-09-13 NOTE — ASSESSMENT & PLAN NOTE
54 year old with history of IVDU (currently in rehab program), presented with paronychia/felon of right 3rd finger with evidence of possible early osteomyelitis on MRI.  He underwent bedside I&D by Orthopedics.  Wound cultures + for MRSA. Blood cultures NGTD.  Orthopedics following closely and will re-evaluate need for surgical I&D tomorrow.  Currently on IV vancomycin.    Afebrile, no leukocytosis   CRP 39.  ID is consulted for osteomyelitis of the right 3rd finger.     Plan/recommendations:    Continue IV vancomycin (pharmacy to dose.  Trough goal 15-20)    Ideally, recommend 6 weeks of IV antibiotics for finger/hand osteo. Would need placement for IV abx.     Will follow up any further surgical plans and follow up tomorrow.     Data reviewed and plan discussed with ID staff  Secure chat/Discussed above plan with Primary Team

## 2024-09-13 NOTE — HPI
Max Mcgregor is a 54 year old with history of IVDU, remote history of prior traumatic amputations of right 4th and 5th fingers from a work injury, who presented from Drug Rehab facility with pain, swelling to right 3rd finger X approximately 14 days.  He believes it started with a splinter in the hand when he was trying to move a pallet.  He denies injecting into right hand.  He voluntarily admitted himself to rehab and was given oral abx without improvement and presented to ED.  An MRI was concerning for possible early osteomyelitis of the distal phalanx.   Orthopedics consulted and he underwent a bedside decompression of paronychia and I&D of felon.  Nail removed.  Cultures are showing MRSA.  Blood cultures negative.   Patient is afebrile, no leukocytosis   CRP 39.   Orthopedics following closely. NPO tonight for possible surgical I&D.  ID is consulted for osteomyelitis of the right 3rd finger.

## 2024-09-13 NOTE — PLAN OF CARE
Problem: Adult Inpatient Plan of Care  Goal: Plan of Care Review  Outcome: Progressing  Flowsheets (Taken 9/13/2024 1378)  Plan of Care Reviewed With: patient  Goal: Patient-Specific Goal (Individualized)  Outcome: Progressing  Goal: Absence of Hospital-Acquired Illness or Injury  Outcome: Progressing  Goal: Optimal Comfort and Wellbeing  Outcome: Progressing  Goal: Readiness for Transition of Care  Outcome: Progressing     Problem: Pain Acute  Goal: Optimal Pain Control and Function  Outcome: Progressing     Problem: Skin or Soft Tissue Infection  Goal: Absence of Infection Signs and Symptoms  Outcome: Progressing     Problem: Infection  Goal: Absence of Infection Signs and Symptoms  Outcome: Progressing

## 2024-09-13 NOTE — CONSULTS
Double lumen PICC to Left Basilic vein.  44 cm in length, 30 cm arm circumference and 1 cm exposed.   Lot # [id number].

## 2024-09-13 NOTE — CONSULTS
[facility]  Infectious Disease  Consult Note    Patient Name: Max Mcgregor  MRN: [id number]  Admission Date: 9/9/2024  Hospital Length of Stay: 2 days  Attending Physician: Mirtha Anaya MD  Primary Care Provider: No, Primary Doctor     Isolation Status: No active isolations    Patient information was obtained from patient, ER records, and primary team.      Inpatient consult to Infectious Diseases  Consult performed by: Vanessa Melchor APRN, ANP  Consult ordered by: Mirtha Anaya MD  Reason for consult: OM of finger        Assessment/Plan:     ID  * Osteomyelitis of finger of right hand    54 year old with history of IVDU (currently in rehab program), presented with paronychia/felon of right 3rd finger with evidence of possible early osteomyelitis on MRI.  He underwent bedside I&D by Orthopedics.  Wound cultures + for MRSA. Blood cultures NGTD.  Orthopedics following closely and will re-evaluate need for surgical I&D tomorrow.  Currently on IV vancomycin.    Afebrile, no leukocytosis   CRP 39.  ID is consulted for osteomyelitis of the right 3rd finger.     Plan/recommendations:    Continue IV vancomycin (pharmacy to dose.  Trough goal 15-20)    Ideally, recommend 6 weeks of IV antibiotics for finger/hand osteo. Would need placement for IV abx.     Will follow up any further surgical plans and follow up tomorrow.     Data reviewed and plan discussed with ID staff  Secure chat/Discussed above plan with Primary Team        GI  Hepatitis C antibody test positive  HCV antibody + and Quantitative RNA +.  Will need outpatient referral to Hepatology for HCV management      Thank you.   Please Secure Chat for any questions or concerns.  GOYO Enrique, ANP-C  Infectious Disease     I spent a total of 80 minutes on the day of the visit.This includes face to face time and non-face to face time preparing to see the patient (eg, review of tests), obtaining and/or reviewing separately obtained history,  documenting clinical information in the electronic or other health record, independently interpreting results and communicating results to the patient/family/caregiver, or care coordinator.     Subjective:     Principal Problem: Osteomyelitis of finger of right hand    HPI: Max Mcgregor is a 54 year old with history of IVDU, remote history of prior traumatic amputations of right 4th and 5th fingers from a work injury, who presented from Drug Rehab facility with pain, swelling to right 3rd finger X approximately 14 days.  He believes it started with a splinter in the hand when he was trying to move a pallet.  He denies injecting into right hand.  He voluntarily admitted himself to rehab and was given oral abx without improvement and presented to ED.  An MRI was concerning for possible early osteomyelitis of the distal phalanx.   Orthopedics consulted and he underwent a bedside decompression of paronychia and I&D of felon.  Nail removed.  Cultures are showing MRSA.  Blood cultures negative.   Patient is afebrile, no leukocytosis   CRP 39.   Orthopedics following closely. NPO tonight for possible surgical I&D.  ID is consulted for osteomyelitis of the right 3rd finger.            Past Medical History:   Diagnosis Date    Cellulitis and abscess of hand 03/09/2020    Left dorsal side, I&D done 3/9/20    Cigarette smoker     History of drug use     cocaine & IV heroin       Past Surgical History:   Procedure Laterality Date    INCISION AND DRAINAGE OF HAND Left 3/10/2020    Procedure: INCISION AND DRAINAGE, WRIST;  Surgeon: Asa Maciel MD;  Location: [facility];  Service: Orthopedics;  Laterality: Left;    NO PAST SURGERIES  03/09/2020       Review of patient's allergies indicates:  No Known Allergies    Medications:  Medications Prior to Admission   Medication Sig    hydrocortisone 2.5 % cream Apply topically 4 (four) times daily. Use as needed until the rash is gone    ketoconazole (NIZORAL) 2 % cream Apply  "topically 2 (two) times daily.     Antibiotics (From admission, onward)      Start     Stop Route Frequency Ordered    09/13/24 0530  vancomycin 1,500 mg in D5W 250 mL IVPB (admixture device)         -- IV Every 12 hours (non-standard times) 09/12/24 1825    09/09/24 1948  vancomycin - pharmacy to dose  (vancomycin IVPB (PEDS and ADULTS))        Placed in "And" Linked Group    -- IV pharmacy to manage frequency 09/09/24 1900          Antifungals (From admission, onward)      None          Antivirals (From admission, onward)      None             Immunization History   Administered Date(s) Administered    Tdap 03/09/2020       Family History    None       Social History     Socioeconomic History    Marital status: Single   Tobacco Use    Smoking status: Every Day     Current packs/day: 0.50     Types: Cigarettes    Smokeless tobacco: Never   Substance and Sexual Activity    Alcohol use: Yes     Comment: 6 pack of beer per week    Drug use: Not Currently     Types: Heroin, "Crack" cocaine     Comment: 3/9/20:  IV heroin     Sexual activity: Yes     Partners: Female   Social History Narrative    ** Merged History Encounter **          Social Determinants of Health     Food Insecurity: Food Insecurity Present (7/11/2024)    Received from [facility]    Hunger Vital Sign     Worried About Running Out of Food in the Last Year: Sometimes true     Ran Out of Food in the Last Year: Sometimes true   Transportation Needs: Patient Unable To Answer (7/11/2024)    Received from [facility]    PRAPARE - Transportation     Lack of Transportation (Medical): Patient unable to answer     Lack of Transportation (Non-Medical): Patient unable to answer     Review of Systems   Musculoskeletal:  Positive for arthralgias.   Skin:  Positive for color change.   All other systems reviewed and are negative.    Objective:     Vital Signs (Most Recent):  Temp: 97.7 °F (36.5 °C) (09/12/24 1637)  Pulse: 98 (09/12/24 1637)  Resp: 18 (09/12/24 " 1800)  BP: 106/63 (09/12/24 1637)  SpO2: 97 % (09/12/24 1637) Vital Signs (24h Range):  Temp:  [97.6 °F (36.4 °C)-98 °F (36.7 °C)] 97.7 °F (36.5 °C)  Pulse:  [] 98  Resp:  [18-20] 18  SpO2:  [95 %-98 %] 97 %  BP: (103-123)/(63-75) 106/63     Weight: 66.3 kg (146 lb 2.6 oz)  Body mass index is 22.89 kg/m².    Estimated Creatinine Clearance: 97.5 mL/min (based on SCr of 0.8 mg/dL).     Physical Exam  Constitutional:       General: He is not in acute distress.     Appearance: He is not ill-appearing, toxic-appearing or diaphoretic.   HENT:      Mouth/Throat:      Mouth: Mucous membranes are moist.   Eyes:      General: No scleral icterus.     Conjunctiva/sclera: Conjunctivae normal.   Cardiovascular:      Rate and Rhythm: Normal rate and regular rhythm.   Pulmonary:      Effort: Pulmonary effort is normal. No respiratory distress.   Musculoskeletal:      Cervical back: Normal range of motion.      Right lower leg: No edema.      Left lower leg: No edema.      Comments: Right 3rd finger wrapped.  C/d/I.  No proximal erythema   Skin:     General: Skin is warm and dry.   Neurological:      Mental Status: He is alert.                Significant Labs: Blood Culture:   Recent Labs   Lab 09/09/24  1342   LABBLOO No Growth to date  No Growth to date  No Growth to date  No Growth to date  No Growth to date  No Growth to date  No Growth to date  No Growth to date     CBC:   Recent Labs   Lab 09/11/24  0400 09/12/24  0405   WBC 8.21 7.16   HGB 13.0* 13.6*   HCT 41.3 41.6    390     CMP:   Recent Labs   Lab 09/11/24  0400 09/12/24  0405   * 134*   K 4.8 4.3    105   CO2 22* 20*   GLU 96 127*   BUN 17 18   CREATININE 0.8 0.8   CALCIUM 9.5 10.2   PROT 7.8 8.0   ALBUMIN 3.1* 3.2*   BILITOT 0.4 0.2   ALKPHOS 74 71   AST 10 10   ALT 7* 9*   ANIONGAP 8 9     Microbiology Results (last 7 days)       Procedure Component Value Units Date/Time    AFB Culture & Smear [[id number]] Collected: 09/10/24 3512     Order Status: Completed Specimen: Abscess from Finger, Right Hand Updated: 09/12/24 1610     AFB Culture & Smear Culture in progress     AFB CULTURE STAIN No acid fast bacilli seen.    Blood culture #1 **CANNOT BE ORDERED STAT** [[id number]] Collected: 09/09/24 1342    Order Status: Completed Specimen: Blood from Peripheral, Antecubital, Right Updated: 09/12/24 1422     Blood Culture, Routine No Growth to date      No Growth to date      No Growth to date      No Growth to date    Blood culture #2 **CANNOT BE ORDERED STAT** [[id number]] Collected: 09/09/24 1342    Order Status: Completed Specimen: Blood from Peripheral, Antecubital, Left Updated: 09/12/24 1422     Blood Culture, Routine No Growth to date      No Growth to date      No Growth to date      No Growth to date    Aerobic culture [[id number]]  (Abnormal)  (Susceptibility) Collected: 09/10/24 1115    Order Status: Completed Specimen: Abscess from Finger, Right Hand Updated: 09/12/24 1337     Aerobic Bacterial Culture METHICILLIN RESISTANT STAPHYLOCOCCUS AUREUS  Many      Culture, Anaerobic [[id number]] Collected: 09/10/24 1115    Order Status: Completed Specimen: Abscess from Finger, Right Hand Updated: 09/11/24 1834     Anaerobic Culture Culture in progress    Gram stain [[id number]] Collected: 09/10/24 1115    Order Status: Completed Specimen: Abscess from Finger, Right Hand Updated: 09/10/24 1406     Gram Stain Result Few WBC's      Moderate Gram positive cocci    Fungus culture [[id number]] Collected: 09/10/24 1115    Order Status: Sent Specimen: Abscess from Finger, Right Hand Updated: 09/10/24 1131          Wound Culture:   Recent Labs   Lab 09/10/24  1115   LABAERO METHICILLIN RESISTANT STAPHYLOCOCCUS AUREUS  Many  *       Significant Imaging: I have reviewed all pertinent imaging results/findings within the past 24 hours.

## 2024-09-14 LAB
BACTERIA BLD CULT: NORMAL
BACTERIA BLD CULT: NORMAL

## 2024-09-14 NOTE — DISCHARGE SUMMARY
[facility] [address] [place] [address] Medicine  Discharge Summary      Patient Name: Max Mcgregor  MRN: [id number]  JANICE: [id number]  Patient Class: IP- Inpatient  Admission Date: 9/9/2024  Hospital Length of Stay: 3 days  Discharge Date and Time: 9/13/2024  7:35 PM  Attending Physician: Vida att. providers found   Discharging Provider: Susanna Aguiar PA-C  Primary Care Provider: Vida Primary Doctor  [facility] Medicine Team: [facility] MED F Susanna Aguiar PA-C  Primary Care Team: [facility] MED F    HPI:   54-year-old male with a history of IV drug use presents with pain to the right 3rd finger. He is currently on rehab voluntarily for reported heroin abuse. Symptoms started over the weekend. Worse today. He denies a history of injecting into the right arm. He says he usually injects into the left. He has a remote history of injury to the right hand from work. Patient denies nausea, vomiting, diarrhea, fever, cough, shortness of breath, chest pain, abdominal pain, or dysuria. He has had cellulitis and abscess  The patients available PMH, PSH, Social History, medications, allergies, and triage vital signs were reviewed just prior to their medical evaluation.     In the ED patient afebrile and hemodynamically stable saturating well on room air at rest. ESR and CRP elevated. MRI ordered and Orthopedic surgery consulted for concern of possible flexor tenosynovitis. Patient started on vanc and zosyn and admitted to the care of medicine for further evaluation and management.     * No surgery found *      Hospital Course:   Max Mcgregor is a 54 y.o. male who was admitted to hospital medicine Pt was seen by orthopedic surgery. Ortho recommended f/u with MRI. MRI hand/fingers without contrast of right hand: Soft tissue edema middle/distal phalanges 3rd digit and focal well-defined fluid collection at the dorsum of the of the 3rd middle phalanx/DIP level with underlying bone marrow edema of the distal phalanx. While there is no  definitive evidence for osteomyelitis of the distal phalanx at this time, the finding of bone marrow edema adjacent to the soft tissue abnormality may represent early osteomyelitis, with concomitant increased risk of development of osteomyelitis. Pt started and scheduled on abx vancomycin. Wound cultures growing MRSA. ID consulted in anticipation of outpatient IV abx need. ID with recs for daptomycin at discharge, PICC line placed. Patient accepted to LTAC for IV abx.  Patient medically ready for discharge. Plan to follow up with PCP, ortho, ID. Return precautions provided. Patient was seen and assessed on day of discharge. Plan of care discussed with patient, patient agreeable with plan, and all questions answered.    Physical Exam  Gen: in NAD, appears stated age  Neuro: mental status at baseline, motor, sensory, and strength grossly intact BL  HEENT: EOMI, PERRLA; no JVD appreciated  CVS: RRR, no m/r/g  Resp: lungs CTAB, no w/r/r; no belabored breathing or accessory muscle use appreciated   Abd: NTND, soft to palpation  Extrem: no UE or LE edema BL       Goals of Care Treatment Preferences:  Code Status: Full Code         Consults:   Consults (From admission, onward)          Status Ordering Provider     Inpatient consult to PICC team (NIAS)  Once        Provider:  (Not yet assigned)    Completed DARNELL WISE     Inpatient consult to Infectious Diseases  Once        Provider:  (Not yet assigned)    Completed CINDY HALE     Inpatient consult to Orthopedic Surgery  Once        Provider:  (Not yet assigned)    Completed YAMILET HICKS new Assessment & Plan notes have been filed under this hospital service since the last note was generated.  Service: Hospital Medicine    Final Active Diagnoses:    Diagnosis Date Noted POA    PRINCIPAL PROBLEM:  Osteomyelitis of finger of right hand [M86.9] 09/11/2024 Yes    Cellulitis of right hand [L03.113] 09/10/2024 Yes    Hepatitis C antibody test  positive [R76.8] 09/10/2024 Yes    Paronychia of right middle finger [L03.011] 09/10/2024 Yes      Problems Resolved During this Admission:       Discharged Condition: good    Disposition: Home or Self Care    Follow Up:    Patient Instructions:      Ambulatory referral/consult to Smoking Cessation Program   Standing Status: Future   Referral Priority: Routine Referral Type: Consultation   Referral Reason: Specialty Services Required   Requested Specialty: CTTS   Number of Visits Requested: 1     Notify your health care provider if you experience any of the following:  temperature >100.4     Notify your health care provider if you experience any of the following:  severe uncontrolled pain     Notify your health care provider if you experience any of the following:  redness, tenderness, or signs of infection (pain, swelling, redness, odor or green/yellow discharge around incision site)     Activity as tolerated       Significant Diagnostic Studies: N/A    Pending Diagnostic Studies:       None           Medications:  Reconciled Home Medications:      Medication List        START taking these medications      acetaminophen 500 MG tablet  Commonly known as: TYLENOL  Take 2 tablets (1,000 mg total) by mouth 3 (three) times daily.     ibuprofen 400 MG tablet  Commonly known as: ADVIL,MOTRIN  Take 1 tablet (400 mg total) by mouth every 4 (four) hours as needed (pain).     methocarbamoL 500 MG Tab  Commonly known as: ROBAXIN  Take 1 tablet (500 mg total) by mouth 4 (four) times daily. for 10 days     nicotine 21 mg/24 hr  Commonly known as: NICODERM CQ  Place 1 patch onto the skin once daily.     oxyCODONE 10 mg Tab immediate release tablet  Commonly known as: ROXICODONE  Take 1 tablet (10 mg total) by mouth every 4 (four) hours as needed (severe pain only).            STOP taking these medications      hydrocortisone 2.5 % cream     ketoconazole 2 % cream  Commonly known as: NIZORAL              Indwelling Lines/Drains at  time of discharge:   Lines/Drains/Airways       Peripherally Inserted Central Catheter Line  Duration             PICC Double Lumen 09/13/24 1600 left basilic <1 day                    Time spent on the discharge of patient: 37 minutes         Susanna Aguiar PA-C  Department of Hospital Medicine  [facility] - Observation 11H

## 2024-09-14 NOTE — PROGRESS NOTES
[facility]  Infectious Disease  Progress Note    Patient Name: Max Mcgregor  MRN: [id number]  Admission Date: 9/9/2024  Length of Stay: 3 days  Attending Physician: No att. providers found  Primary Care Provider: No, Primary Doctor    Isolation Status: No active isolations  Assessment/Plan:      ID  * Osteomyelitis of finger of right hand    54 year old with history of IVDU (currently in rehab program), presented with paronychia/felon of right 3rd finger with evidence of early osteomyelitis on MRI.  He underwent bedside I&D by Orthopedics.  Wound cultures + for MRSA. Blood cultures NGTD.  Orthopedics following closely. No further surgical intervention as of today.   Afebrile, no leukocytosis   CRP 39>15 today.  Currently on IV vancomycin 1500 q 12 - trough not therapeutic.      Plan/recommendations:   Difficulty reaching therapeutic levels of IV vancomycin.  Discontinue IV vanc and start IV daptomycin 8 mg/kg q 24 hours. Baseline CK wnl    Recommend IV daptomycin X 6 weeks for MRSA osteo of finger.  EOC 10/21/24.    Weekly cbc, cmp, crp, and CK while on daptomycin    Recommend ID consult at LT.     Follow up with ID and Orthopedics after d/c from LT     Data reviewed and plan discussed with ID staff  Secure chat/Discussed above plan with Primary Team        Thank you.   Please Secure Chat for any questions or concerns.  GOYO Enrique, ANP-C  Infectious Disease     I spent a total of 40 minutes on the day of the visit.This includes face to face time and non-face to face time preparing to see the patient (eg, review of tests), obtaining and/or reviewing separately obtained history, documenting clinical information in the electronic or other health record, independently interpreting results and communicating results to the patient/family/caregiver, or care coordinator.   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  He believes it started with a splinter in the hand when he was trying to move a pallet.  He denies injecting into right hand.  He voluntarily admitted himself to rehab and was given oral abx without improvement and presented to ED.  An MRI was concerning for possible early osteomyelitis of the distal phalanx.   Orthopedics consulted and he underwent a bedside decompression of paronychia and I&D of felon.  Nail removed.  Cultures are showing MRSA.  Blood cultures negative.   Patient is afebrile, no leukocytosis   CRP 39.   Orthopedics following closely. NPO tonight for possible surgical I&D.  ID is consulted for osteomyelitis of the right 3rd finger.          Interval History:   No further Orthopedic intervention   Plan to tsfr to LTAC for IV abx   Reports pain better today.  Afebrile, no leukocytosis    Review of Systems   Musculoskeletal:  Positive for arthralgias.   Skin:  Positive for color change.   All other systems reviewed and are negative.    Objective:     Vital Signs (Most Recent):  Temp: 97.8 °F (36.6 °C) (09/13/24 1634)  Pulse: 105 (09/13/24 1634)  Resp: 18 (09/13/24 1634)  BP: 116/76 (09/13/24 1634)  SpO2: 97 % (09/13/24 1634) Vital Signs (24h Range):  Temp:  [97.8 °F (36.6 °C)-98.6 °F (37 °C)] 97.8 °F (36.6 °C)  Pulse:  [] 105  Resp:  [16-18] 18  SpO2:  [95 %-98 %] 97 %  BP: (101-120)/(70-76) 116/76     Weight: 66.3 kg (146 lb 2.6 oz)  Body mass index is 22.89 kg/m².    Estimated Creatinine Clearance: 78 mL/min (based on SCr of 1 mg/dL).     Physical Exam  Constitutional:       General: He is not in acute distress.     Appearance: He is not ill-appearing, toxic-appearing or diaphoretic.   HENT:      Mouth/Throat:      Mouth: Mucous membranes are moist.   Eyes:      General: No scleral  icterus.     Conjunctiva/sclera: Conjunctivae normal.   Cardiovascular:      Rate and Rhythm: Normal rate and regular rhythm.   Pulmonary:      Effort: Pulmonary effort is normal. No respiratory distress.   Musculoskeletal:      Cervical back: Normal range of motion.      Right lower leg: No edema.      Left lower leg: No edema.      Comments: Right 3rd finger wrapped.  C/d/I.  No proximal erythema   Skin:     General: Skin is warm and dry.   Neurological:      Mental Status: He is alert.          Significant Labs: Blood Culture:   Recent Labs   Lab 09/09/24 1342   LABBLOO No Growth to date  No Growth to date  No Growth to date  No Growth to date  No Growth to date  No Growth to date  No Growth to date  No Growth to date  No Growth to date  No Growth to date     CBC:   Recent Labs   Lab 09/12/24 0405 09/13/24  0949   WBC 7.16 6.41   HGB 13.6* 13.5*   HCT 41.6 42.0    384     CMP:   Recent Labs   Lab 09/12/24 0405 09/13/24  0949   * 136   K 4.3 4.7    106   CO2 20* 23   * 86   BUN 18 30*   CREATININE 0.8 1.0   CALCIUM 10.2 9.8   PROT 8.0 7.7   ALBUMIN 3.2* 3.0*   BILITOT 0.2 0.1   ALKPHOS 71 76   AST 10 10   ALT 9* 8*   ANIONGAP 9 7*     Microbiology Results (last 7 days)       Procedure Component Value Units Date/Time    Blood culture #1 **CANNOT BE ORDERED STAT** [[id number]] Collected: 09/09/24 1342    Order Status: Completed Specimen: Blood from Peripheral, Antecubital, Right Updated: 09/13/24 1422     Blood Culture, Routine No Growth to date      No Growth to date      No Growth to date      No Growth to date      No Growth to date    Blood culture #2 **CANNOT BE ORDERED STAT** [[id number]] Collected: 09/09/24 1342    Order Status: Completed Specimen: Blood from Peripheral, Antecubital, Left Updated: 09/13/24 1422     Blood Culture, Routine No Growth to date      No Growth to date      No Growth to date      No Growth to date      No Growth to date    Aerobic culture  [[id number]]  (Abnormal)  (Susceptibility) Collected: 09/10/24 1115    Order Status: Completed Specimen: Abscess from Finger, Right Hand Updated: 09/13/24 1143     Aerobic Bacterial Culture METHICILLIN RESISTANT STAPHYLOCOCCUS AUREUS  Many      Culture, Anaerobic [[id number]] Collected: 09/10/24 1115    Order Status: Completed Specimen: Abscess from Finger, Right Hand Updated: 09/13/24 1025     Anaerobic Culture Culture in progress    AFB Culture & Smear [[id number]] Collected: 09/10/24 1115    Order Status: Completed Specimen: Abscess from Finger, Right Hand Updated: 09/12/24 1610     AFB Culture & Smear Culture in progress     AFB CULTURE STAIN No acid fast bacilli seen.    Gram stain [[id number]] Collected: 09/10/24 1115    Order Status: Completed Specimen: Abscess from Finger, Right Hand Updated: 09/10/24 1406     Gram Stain Result Few WBC's      Moderate Gram positive cocci    Fungus culture [[id number]] Collected: 09/10/24 1115    Order Status: Sent Specimen: Abscess from Finger, Right Hand Updated: 09/10/24 1131          Wound Culture:   Recent Labs   Lab 09/10/24  1115   LABAERO METHICILLIN RESISTANT STAPHYLOCOCCUS AUREUS  Many  *       Significant Imaging: I have reviewed all pertinent imaging results/findings within the past 24 hours.

## 2024-09-14 NOTE — SUBJECTIVE & OBJECTIVE
Interval History:   No further Orthopedic intervention   Plan to tsfr to LTAC for IV abx   Reports pain better today.  Afebrile, no leukocytosis    Review of Systems   Musculoskeletal:  Positive for arthralgias.   Skin:  Positive for color change.   All other systems reviewed and are negative.    Objective:     Vital Signs (Most Recent):  Temp: 97.8 °F (36.6 °C) (09/13/24 1634)  Pulse: 105 (09/13/24 1634)  Resp: 18 (09/13/24 1634)  BP: 116/76 (09/13/24 1634)  SpO2: 97 % (09/13/24 1634) Vital Signs (24h Range):  Temp:  [97.8 °F (36.6 °C)-98.6 °F (37 °C)] 97.8 °F (36.6 °C)  Pulse:  [] 105  Resp:  [16-18] 18  SpO2:  [95 %-98 %] 97 %  BP: (101-120)/(70-76) 116/76     Weight: 66.3 kg (146 lb 2.6 oz)  Body mass index is 22.89 kg/m².    Estimated Creatinine Clearance: 78 mL/min (based on SCr of 1 mg/dL).     Physical Exam  Constitutional:       General: He is not in acute distress.     Appearance: He is not ill-appearing, toxic-appearing or diaphoretic.   HENT:      Mouth/Throat:      Mouth: Mucous membranes are moist.   Eyes:      General: No scleral icterus.     Conjunctiva/sclera: Conjunctivae normal.   Cardiovascular:      Rate and Rhythm: Normal rate and regular rhythm.   Pulmonary:      Effort: Pulmonary effort is normal. No respiratory distress.   Musculoskeletal:      Cervical back: Normal range of motion.      Right lower leg: No edema.      Left lower leg: No edema.      Comments: Right 3rd finger wrapped.  C/d/I.  No proximal erythema   Skin:     General: Skin is warm and dry.   Neurological:      Mental Status: He is alert.          Significant Labs: Blood Culture:   Recent Labs   Lab 09/09/24  1342   LABBLOO No Growth to date  No Growth to date  No Growth to date  No Growth to date  No Growth to date  No Growth to date  No Growth to date  No Growth to date  No Growth to date  No Growth to date     CBC:   Recent Labs   Lab 09/12/24  0405 09/13/24  0949   WBC 7.16 6.41   HGB 13.6* 13.5*   HCT 41.6  42.0    384     CMP:   Recent Labs   Lab 09/12/24  0405 09/13/24  0949   * 136   K 4.3 4.7    106   CO2 20* 23   * 86   BUN 18 30*   CREATININE 0.8 1.0   CALCIUM 10.2 9.8   PROT 8.0 7.7   ALBUMIN 3.2* 3.0*   BILITOT 0.2 0.1   ALKPHOS 71 76   AST 10 10   ALT 9* 8*   ANIONGAP 9 7*     Microbiology Results (last 7 days)       Procedure Component Value Units Date/Time    Blood culture #1 **CANNOT BE ORDERED STAT** [[id number]] Collected: 09/09/24 1342    Order Status: Completed Specimen: Blood from Peripheral, Antecubital, Right Updated: 09/13/24 1422     Blood Culture, Routine No Growth to date      No Growth to date      No Growth to date      No Growth to date      No Growth to date    Blood culture #2 **CANNOT BE ORDERED STAT** [[id number]] Collected: 09/09/24 1342    Order Status: Completed Specimen: Blood from Peripheral, Antecubital, Left Updated: 09/13/24 1422     Blood Culture, Routine No Growth to date      No Growth to date      No Growth to date      No Growth to date      No Growth to date    Aerobic culture [[id number]]  (Abnormal)  (Susceptibility) Collected: 09/10/24 1115    Order Status: Completed Specimen: Abscess from Finger, Right Hand Updated: 09/13/24 1143     Aerobic Bacterial Culture METHICILLIN RESISTANT STAPHYLOCOCCUS AUREUS  Many      Culture, Anaerobic [[id number]] Collected: 09/10/24 1115    Order Status: Completed Specimen: Abscess from Finger, Right Hand Updated: 09/13/24 1025     Anaerobic Culture Culture in progress    AFB Culture & Smear [[id number]] Collected: 09/10/24 1115    Order Status: Completed Specimen: Abscess from Finger, Right Hand Updated: 09/12/24 1610     AFB Culture & Smear Culture in progress     AFB CULTURE STAIN No acid fast bacilli seen.    Gram stain [[id number]] Collected: 09/10/24 1115    Order Status: Completed Specimen: Abscess from Finger, Right Hand Updated: 09/10/24 1406     Gram Stain Result Few WBC's      Moderate Gram positive cocci     Fungus culture [[id number]] Collected: 09/10/24 1115    Order Status: Sent Specimen: Abscess from Finger, Right Hand Updated: 09/10/24 1131          Wound Culture:   Recent Labs   Lab 09/10/24  1115   LABAERO METHICILLIN RESISTANT STAPHYLOCOCCUS AUREUS  Many  *       Significant Imaging: I have reviewed all pertinent imaging results/findings within the past 24 hours.

## 2024-09-14 NOTE — NURSING
Transport here to take patient via wheelchair. Pt left with PICC in left upper arm. Patient departed with belongings and AVS with no distress noted.

## 2024-09-14 NOTE — ASSESSMENT & PLAN NOTE
54 year old with history of IVDU (currently in rehab program), presented with paronychia/felon of right 3rd finger with evidence of early osteomyelitis on MRI.  He underwent bedside I&D by Orthopedics.  Wound cultures + for MRSA. Blood cultures NGTD.  Orthopedics following closely. No further surgical intervention as of today.   Afebrile, no leukocytosis   CRP 39>15 today.  Currently on IV vancomycin 1500 q 12 - trough not therapeutic.      Plan/recommendations:   Difficulty reaching therapeutic levels of IV vancomycin.  Discontinue IV vanc and start IV daptomycin 8 mg/kg q 24 hours. Baseline CK wnl    Recommend IV daptomycin X 6 weeks for MRSA osteo of finger.  EOC 10/21/24.    Weekly cbc, cmp, crp, and CK while on daptomycin    Recommend ID consult at LTAC.     Follow up with ID and Orthopedics after d/c from LTAC     Data reviewed and plan discussed with ID staff  Secure chat/Discussed above plan with Primary Team

## 2024-09-16 LAB
BACTERIA SPEC ANAEROBE CULT: NORMAL
FUNGUS SPEC CULT: NORMAL

## 2024-09-20 DIAGNOSIS — B19.20 HEPATITIS C TEST POSITIVE: Primary | ICD-10-CM

## 2024-10-21 ENCOUNTER — TELEPHONE (OUTPATIENT)
Dept: INFECTIOUS DISEASES | Facility: CLINIC | Age: 54
End: 2024-10-21
Payer: MEDICAID

## 2024-10-21 NOTE — TELEPHONE ENCOUNTER
Called [facility], they stated pt d/c on October 8th, they pulled PICC before he left    Called all numbers on pt chart, no answer, left vm on one number    Called pt relative, they will tell him to call us back

## 2024-10-21 NOTE — TELEPHONE ENCOUNTER
----- Message from GOYO Enrique, ANP sent at 10/20/2024 10:15 AM CDT -----  Will you see if this patient is still at LTAC?   On IV daptomycin?   Needs ID f/u with me after d/c from LTAC  Went to  [facility] on 9/13/24.  [phone]   Let me know.  Thanks.

## (undated) DEVICE — GLOVE BIOGEL ORTHOPEDIC 7.5

## (undated) DEVICE — PAD CAST SPECIALIST STRL 3

## (undated) DEVICE — TOURNIQUET SB QC DP 18X4IN

## (undated) DEVICE — COLLECTOR SPECIMEN ANAEROBIC

## (undated) DEVICE — ELECTRODE REM PLYHSV RETURN 9

## (undated) DEVICE — UNDERGLOVES BIOGEL PI SIZE 8

## (undated) DEVICE — GLOVE SURGICAL LATEX SZ 8

## (undated) DEVICE — SWAB CULTURETTE II DUAL

## (undated) DEVICE — STRIP PACKING ANTIMIC 1/4X1

## (undated) DEVICE — CORD FOR BIPOLAR FORCEPS 12

## (undated) DEVICE — DRAPE PLASTIC U 60X72

## (undated) DEVICE — UNDERGLOVES BIOGEL PI SIZE 7.5

## (undated) DEVICE — SEE MEDLINE ITEM 157173

## (undated) DEVICE — GAUZE SPONGE 4X4 12PLY

## (undated) DEVICE — SEE MEDLINE ITEM 152529

## (undated) DEVICE — COVER OVERHEAD SURG LT BLUE

## (undated) DEVICE — SEE MEDLINE ITEM 157117

## (undated) DEVICE — BANDAGE RUBBER ELAS STD 3X5YD

## (undated) DEVICE — SEE MEDLINE ITEM 107746

## (undated) DEVICE — PAD PREP 50/CA

## (undated) DEVICE — SOL 9P NACL IRR PIC IL